# Patient Record
Sex: MALE | Race: WHITE | NOT HISPANIC OR LATINO | Employment: OTHER | URBAN - METROPOLITAN AREA
[De-identification: names, ages, dates, MRNs, and addresses within clinical notes are randomized per-mention and may not be internally consistent; named-entity substitution may affect disease eponyms.]

---

## 2019-11-26 ENCOUNTER — OFFICE VISIT (OUTPATIENT)
Dept: NEUROLOGY | Facility: CLINIC | Age: 79
End: 2019-11-26
Payer: MEDICARE

## 2019-11-26 VITALS
WEIGHT: 230.38 LBS | DIASTOLIC BLOOD PRESSURE: 60 MMHG | HEART RATE: 71 BPM | BODY MASS INDEX: 32.98 KG/M2 | HEIGHT: 70 IN | SYSTOLIC BLOOD PRESSURE: 138 MMHG

## 2019-11-26 DIAGNOSIS — G20.C PARKINSONISM, UNSPECIFIED PARKINSONISM TYPE: ICD-10-CM

## 2019-11-26 DIAGNOSIS — G20.A1 PARKINSON'S DISEASE: Primary | ICD-10-CM

## 2019-11-26 PROCEDURE — 1126F AMNT PAIN NOTED NONE PRSNT: CPT | Mod: ,,, | Performed by: PSYCHIATRY & NEUROLOGY

## 2019-11-26 PROCEDURE — 99204 OFFICE O/P NEW MOD 45 MIN: CPT | Mod: PBBFAC | Performed by: PSYCHIATRY & NEUROLOGY

## 2019-11-26 PROCEDURE — 1126F PR PAIN SEVERITY QUANTIFIED, NO PAIN PRESENT: ICD-10-PCS | Mod: ,,, | Performed by: PSYCHIATRY & NEUROLOGY

## 2019-11-26 PROCEDURE — 99999 PR PBB SHADOW E&M-NEW PATIENT-LVL IV: ICD-10-PCS | Mod: PBBFAC,,, | Performed by: PSYCHIATRY & NEUROLOGY

## 2019-11-26 PROCEDURE — 1159F MED LIST DOCD IN RCRD: CPT | Mod: ,,, | Performed by: PSYCHIATRY & NEUROLOGY

## 2019-11-26 PROCEDURE — 99204 PR OFFICE/OUTPT VISIT, NEW, LEVL IV, 45-59 MIN: ICD-10-PCS | Mod: S$PBB,,, | Performed by: PSYCHIATRY & NEUROLOGY

## 2019-11-26 PROCEDURE — 1159F PR MEDICATION LIST DOCUMENTED IN MEDICAL RECORD: ICD-10-PCS | Mod: ,,, | Performed by: PSYCHIATRY & NEUROLOGY

## 2019-11-26 PROCEDURE — 99999 PR PBB SHADOW E&M-NEW PATIENT-LVL IV: CPT | Mod: PBBFAC,,, | Performed by: PSYCHIATRY & NEUROLOGY

## 2019-11-26 PROCEDURE — 99204 OFFICE O/P NEW MOD 45 MIN: CPT | Mod: S$PBB,,, | Performed by: PSYCHIATRY & NEUROLOGY

## 2019-11-26 RX ORDER — METOPROLOL TARTRATE 25 MG/1
25 TABLET, FILM COATED ORAL 2 TIMES DAILY
COMMUNITY

## 2019-11-26 RX ORDER — CARBIDOPA AND LEVODOPA 25; 100 MG/1; MG/1
1 TABLET ORAL 3 TIMES DAILY
Qty: 90 TABLET | Refills: 11 | Status: SHIPPED | OUTPATIENT
Start: 2019-11-26 | End: 2020-02-17

## 2019-11-26 RX ORDER — ROPINIROLE 1 MG/1
1 TABLET, FILM COATED ORAL 3 TIMES DAILY
COMMUNITY
End: 2021-01-12

## 2019-11-26 RX ORDER — OMEPRAZOLE 20 MG/1
20 CAPSULE, DELAYED RELEASE ORAL
COMMUNITY

## 2019-11-26 RX ORDER — HYDROCHLOROTHIAZIDE 25 MG/1
25 TABLET ORAL
COMMUNITY

## 2019-11-26 RX ORDER — ROSUVASTATIN CALCIUM 5 MG/1
2.5 TABLET, COATED ORAL
COMMUNITY

## 2019-11-26 RX ORDER — RASAGILINE 1 MG/1
1 TABLET ORAL DAILY
COMMUNITY

## 2019-11-26 RX ORDER — WARFARIN SODIUM 5 MG/1
5 TABLET ORAL
COMMUNITY

## 2019-11-26 RX ORDER — LOSARTAN POTASSIUM 50 MG/1
50 TABLET ORAL
COMMUNITY

## 2019-11-26 NOTE — PROGRESS NOTES
"MOVEMENT DISORDERS CLINIC NEW CONSULT NOTE    PCP/Referring Provider: Reese Self  No address on file  Date of Service: 11/26/2019    Chief Complaint: FOG    HPI: Braulio Monterroso is a R HANDED 79 y.o. male with a medical issues significant for Pes, HTN, ADELINA, Cspine dz s/p surgery, hip replacement, who presents with Parkinson's with new onset FOG [facility]. He notes 10 years ago progressive dysphagia and hypophonia, followed by a shuffling gait at age 76. This started with a L-sided foot drag. He currently walks with a cane and can go half a mile with stops. Without stops he can go 1 block. After Jan 2018 he started having FOG spells. He has near constant FOG art this point. He currently uses a walker at home and a cane. He falls once a month usually forwards.     Started carbidopa/levodopa 1 year ago but after he started getting migraines.  He's never had a clear ONtime  He currently takes ropinirole 3/3/2mg  Rasagiline 1mg QHS  He has ankle swelling  No memory loss  No orthostasis  No impulsiveness    Goes to speech therapy and gets injections in his vocal cords    Does rock steady boxing  He rides a stationary bike    PD Review of Symptoms:  Anosmia: "hes's never had one"  Dysarthria/Hypophonia: Severe  Dysphagia/Sialorrhea: Yes  Hallucinations: None  Depression: None  Cognitive slowing: None  Impulsivity: None  Urinary changes: Frequency  Constipation: None  Orthostasis: None  Dyskinesia: None  Falls: As above  Freezing: Yes  Micrographia: Yes  Sleep issues:  -ADELINA: wears cpap  -RBD: None   -Sleep Quality: at times OK      Review of Systems:   Review of Systems   Constitutional: Negative for fever.   HENT: Negative for congestion.    Eyes: Negative for double vision.   Respiratory: Negative for cough and shortness of breath.    Cardiovascular: Negative for chest pain and leg swelling.   Gastrointestinal: Negative for nausea.   Genitourinary: Negative for dysuria.   Musculoskeletal: " Positive for falls.   Skin: Negative for rash.   Neurological: Positive for tremors and speech change. Negative for headaches.   Psychiatric/Behavioral: Negative for depression.       Neuroleptic exposure:  None    Current Medications:  Outpatient Encounter Medications as of 11/26/2019   Medication Sig Dispense Refill    hydroCHLOROthiazide (HYDRODIURIL) 25 MG tablet Take 25 mg by mouth.      losartan (COZAAR) 50 MG tablet Take 50 mg by mouth.      metoprolol tartrate (LOPRESSOR) 25 MG tablet Take 25 mg by mouth 2 (two) times daily.      omeprazole (PRILOSEC) 20 MG capsule Take 20 mg by mouth.      rasagiline (AZILECT) 1 mg Tab Take 1 mg by mouth once daily.      rOPINIRole (REQUIP) 1 MG tablet Take 1 mg by mouth 3 (three) times daily. Take 3 tablets by mouth 2 times a day and take 2 tablets at bedtime      rosuvastatin (CRESTOR) 5 MG tablet Take 2.5 mg by mouth.      warfarin (COUMADIN) 5 MG tablet Take 5 mg by mouth.      carbidopa-levodopa  mg (SINEMET)  mg per tablet Take 1 tablet by mouth 3 (three) times daily. 90 tablet 11     No facility-administered encounter medications on file as of 11/26/2019.        Past Medical History:  Patient Active Problem List   Diagnosis    Parkinsonism       Past Surgical History:  History reviewed. No pertinent surgical history.    Current Living Situation: home    Social:  Social History     Socioeconomic History    Marital status:      Spouse name: Not on file    Number of children: Not on file    Years of education: Not on file    Highest education level: Not on file   Occupational History    Not on file   Social Needs    Financial resource strain: Not on file    Food insecurity:     Worry: Not on file     Inability: Not on file    Transportation needs:     Medical: Not on file     Non-medical: Not on file   Tobacco Use    Smoking status: Never Smoker    Smokeless tobacco: Never Used   Substance and Sexual Activity    Alcohol use: Never  "    Frequency: Never    Drug use: Not on file    Sexual activity: Not on file   Lifestyle    Physical activity:     Days per week: Not on file     Minutes per session: Not on file    Stress: Not on file   Relationships    Social connections:     Talks on phone: Not on file     Gets together: Not on file     Attends Gnosticism service: Not on file     Active member of club or organization: Not on file     Attends meetings of clubs or organizations: Not on file     Relationship status: Not on file   Other Topics Concern    Not on file   Social History Narrative    Not on file       Family History:  History reviewed. No pertinent family history.    PHYSICAL:  /60 (BP Location: Left arm, Patient Position: Sitting)   Pulse 71   Ht 5' 10" (1.778 m)   Wt 104.5 kg (230 lb 6.1 oz)   BMI 33.06 kg/m²     General Medical Examination:  General: Good hygiene, appropriate appearance.  HEENT: Normocephalic, atraumatic.   Neck: Supple.   Chest: Unlabored breathing.   CV: Symmetric pulses.   Ext: No clubbing, cyanosis, or edema.     Mental Status:  Mood/Affect: Appropriate/congruent.  Level of consciousness: Awake, alert.  Orientation: Oriented to person, place, time and situation.  Language: No Dysarthria    Cranial nerves:  I: Not tested  II: PERRL, VFF to counting  III, IV, VI: EOMI with conjugate gaze and no nystagmus on end gaze  V: Facial sensation intact and symmetric over the bilateral V1-V3  VII: Facial muscle activation intact and symmetric over the bilateral upper and lower face  VIII: Hearing intact in the b/l ears and symmetrical to finger rub  IX, X, XII: TUP midline - no atrophy or fasiculations  X: SCMs and shoulder shrug full strength b/l and symmetric  Moderate hypomimia  Severe hypophonia    Motor:   -UE: 5/5 deltoids; 5/5 biceps, triceps; 5/5 wrist flexors, extensors; 5/5 interosseous; 5/5   -LEs: 5/5 hip flexion, extension; 5/5 knee flexion, extension; 5/5 ankle flexion, extension    DTRs:  ? " Biceps Triceps Brachioradialis Knee Ankle   Left 2+ 2+ 2+ 2+ 2+   Right 2+ 2+ 2+ 2+ 2+     ? Finger taps Finger flicks SCOTTIE Heel taps   Left 2+ 2+ 2+ 2+   Right 3+ 3+ 2+ 2+   Neck tone: 0  ? Arm Leg   Left 1+ 1+   Right 2+ 2+     Sensation:   -Light touch: Intact and symmetric in the bilateral upper and lower extremities.    Coordination:   -Finger to nose: nl    Gait:  -Arises from chair with use of hands.  -Casual gait is: narrow based, stooped  -Stride length: short, shuffling, with FOG on L, then better after he gets moving  -Arm Swing: decreased BL  -Turnin step  -Tandem gait: cannot      Laboratory Data:  NA    Imaging:  Brain MRI per report NL - no disc avaialble    Assessment//Plan:   Problem List Items Addressed This Visit        Neuro    Parkinsonism - Primary    Current Assessment & Plan     Rigid-predominate PDism. Appear under-dosed on his current regimen, without a noticeable ONtime and prominent FOG. Suggested restarting carbidopa/levodopa 25/100mg 1 tab PO TID. Plan to wean ropinirole later.         Relevant Orders    Ambulatory consult to Physical Therapy          Follow-up: 3mos  Discussed the importance of ongoing exercise in efforts to improve mobility and balance.    Amanda Delgado MD, MS [facility]  Department of Neurology  Movement Disorders

## 2019-11-26 NOTE — ASSESSMENT & PLAN NOTE
Rigid-predominate PDism. Appear under-dosed on his current regimen, without a noticeable ONtime and prominent FOG. Suggested restarting carbidopa/levodopa 25/100mg 1 tab PO TID. Plan to wean ropinirole later.

## 2019-12-05 ENCOUNTER — PATIENT MESSAGE (OUTPATIENT)
Dept: NEUROLOGY | Facility: CLINIC | Age: 79
End: 2019-12-05

## 2019-12-11 ENCOUNTER — PATIENT MESSAGE (OUTPATIENT)
Dept: NEUROLOGY | Facility: CLINIC | Age: 79
End: 2019-12-11

## 2020-01-27 ENCOUNTER — PATIENT MESSAGE (OUTPATIENT)
Dept: NEUROLOGY | Facility: CLINIC | Age: 80
End: 2020-01-27

## 2020-02-17 ENCOUNTER — OFFICE VISIT (OUTPATIENT)
Dept: NEUROLOGY | Facility: CLINIC | Age: 80
End: 2020-02-17
Payer: MEDICARE

## 2020-02-17 VITALS
HEIGHT: 70 IN | DIASTOLIC BLOOD PRESSURE: 59 MMHG | HEART RATE: 68 BPM | BODY MASS INDEX: 33.06 KG/M2 | SYSTOLIC BLOOD PRESSURE: 145 MMHG

## 2020-02-17 DIAGNOSIS — G20.C PARKINSONISM, UNSPECIFIED PARKINSONISM TYPE: ICD-10-CM

## 2020-02-17 PROCEDURE — 99214 OFFICE O/P EST MOD 30 MIN: CPT | Mod: S$PBB,,, | Performed by: PSYCHIATRY & NEUROLOGY

## 2020-02-17 PROCEDURE — 99214 PR OFFICE/OUTPT VISIT, EST, LEVL IV, 30-39 MIN: ICD-10-PCS | Mod: S$PBB,,, | Performed by: PSYCHIATRY & NEUROLOGY

## 2020-02-17 PROCEDURE — 99999 PR PBB SHADOW E&M-EST. PATIENT-LVL III: CPT | Mod: PBBFAC,,, | Performed by: PSYCHIATRY & NEUROLOGY

## 2020-02-17 PROCEDURE — 99999 PR PBB SHADOW E&M-EST. PATIENT-LVL III: ICD-10-PCS | Mod: PBBFAC,,, | Performed by: PSYCHIATRY & NEUROLOGY

## 2020-02-17 PROCEDURE — 99213 OFFICE O/P EST LOW 20 MIN: CPT | Mod: PBBFAC | Performed by: PSYCHIATRY & NEUROLOGY

## 2020-02-17 RX ORDER — CHOLECALCIFEROL (VITAMIN D3) 50 MCG
2000 TABLET ORAL 2 TIMES DAILY
COMMUNITY

## 2020-02-17 RX ORDER — CARBIDOPA AND LEVODOPA 25; 100 MG/1; MG/1
1 TABLET ORAL 4 TIMES DAILY
Qty: 120 TABLET | Refills: 11
Start: 2020-02-17 | End: 2020-03-30 | Stop reason: SDUPTHER

## 2020-02-17 NOTE — ASSESSMENT & PLAN NOTE
Rigid-predominate PDism. Appear under-dosed on his current regimen, without a noticeable ONtime and prominent FOG.  Plan to wean ropinirole later.    -INCREASE Sinemet 25/100 1 tab PO to QID  -Continue ropinirole 1 mg tab 3, 2, 2   -Continue rasagiline 1 mg Qdaily  -Keep journal of PD symptoms/sinemet  -Use laser target for gait     -Return to clinic 1 month

## 2020-02-17 NOTE — PROGRESS NOTES
"MOVEMENT DISORDERS CLINIC Follow-up NOTE    PCP/Referring Provider: Reese Self  No address on file  Date of Service: 2/17/2020    Chief Complaint: FOG    HPI:  2/17/20 Interval History    Currently started sinemet 1 tab TID and noticed improvement in his gait (no change in freezing) - wife describes decreased shuffling.  Ropinirole was decreased tablets from 1 mg  3, 3, 2 tabs -> 3, 2, 2 tabs and noticed that gait subsequently worsened again.     "Prior HPI  Braulio Monterroso is a R HANDED 79 y.o. male with a medical issues significant for Pes, HTN, ADELINA, Cspine dz s/p surgery, hip replacement, who presents with Parkinson's with new onset FOG [facility]. He notes 10 years ago progressive dysphagia and hypophonia, followed by a shuffling gait at age 76. This started with a L-sided foot drag. He currently walks with a cane and can go half a mile with stops. Without stops he can go 1 block. After Jan 2018 he started having FOG spells. He has near constant FOG art this point. He currently uses a walker at home and a cane. He falls once a month usually forwards.     Started carbidopa/levodopa 1 year ago but after he started getting migraines.  He's never had a clear ONtime  He currently takes ropinirole 3/3/2mg  Rasagiline 1mg QHS  He has ankle swelling  No memory loss  No orthostasis  No impulsiveness    Goes to speech therapy and gets injections in his vocal cords    Does rock steady boxing  He rides a stationary bike"      PD Review of Symptoms:  Anosmia: "hes's never had one"  Dysarthria/Hypophonia: Severe  Dysphagia/Sialorrhea: Yes  Hallucinations: None  Depression: None  Cognitive slowing: None  Impulsivity: None  Urinary changes: Frequency  Constipation: None  Orthostasis: None  Dyskinesia: None  Falls: As above  Freezing: Yes  Micrographia: Yes  Sleep issues:  -ADELINA: wears cpap  -RBD: None   -Sleep Quality: at times OK      Review of Systems:   Review of Systems   Constitutional: " Negative for fever.   HENT: Negative for congestion.    Eyes: Negative for double vision.   Respiratory: Negative for cough and shortness of breath.    Cardiovascular: Negative for chest pain and leg swelling.   Gastrointestinal: Negative for nausea.   Genitourinary: Negative for dysuria.   Musculoskeletal: Positive for falls.   Skin: Negative for rash.   Neurological: Positive for tremors and speech change. Negative for headaches.   Psychiatric/Behavioral: Negative for depression.       Neuroleptic exposure:  None    Current Medications:  Outpatient Encounter Medications as of 2/17/2020   Medication Sig Dispense Refill    carbidopa-levodopa  mg (SINEMET)  mg per tablet Take 1 tablet by mouth 3 (three) times daily. 90 tablet 11    cholecalciferol, vitamin D3, (VITAMIN D3) 2,000 unit Tab Take 2,000 Units by mouth 2 (two) times daily.      hydroCHLOROthiazide (HYDRODIURIL) 25 MG tablet Take 25 mg by mouth.      losartan (COZAAR) 50 MG tablet Take 50 mg by mouth.      metoprolol tartrate (LOPRESSOR) 25 MG tablet Take 25 mg by mouth 2 (two) times daily.      omeprazole (PRILOSEC) 20 MG capsule Take 20 mg by mouth.      rasagiline (AZILECT) 1 mg Tab Take 1 mg by mouth once daily.      rOPINIRole (REQUIP) 1 MG tablet Take 1 mg by mouth 3 (three) times daily. Take 3 tablets by mouth 2 times a day and take 2 tablets at bedtime      rosuvastatin (CRESTOR) 5 MG tablet Take 2.5 mg by mouth.      warfarin (COUMADIN) 5 MG tablet Take 5 mg by mouth.       No facility-administered encounter medications on file as of 2/17/2020.        Past Medical History:  Patient Active Problem List   Diagnosis    Parkinsonism       Past Surgical History:  History reviewed. No pertinent surgical history.    Current Living Situation: home    Social:  Social History     Socioeconomic History    Marital status:      Spouse name: Not on file    Number of children: Not on file    Years of education: Not on file     "Highest education level: Not on file   Occupational History    Not on file   Social Needs    Financial resource strain: Not on file    Food insecurity:     Worry: Not on file     Inability: Not on file    Transportation needs:     Medical: Not on file     Non-medical: Not on file   Tobacco Use    Smoking status: Never Smoker    Smokeless tobacco: Never Used   Substance and Sexual Activity    Alcohol use: Never     Frequency: Never    Drug use: Not on file    Sexual activity: Not on file   Lifestyle    Physical activity:     Days per week: Not on file     Minutes per session: Not on file    Stress: Not on file   Relationships    Social connections:     Talks on phone: Not on file     Gets together: Not on file     Attends Orthodoxy service: Not on file     Active member of club or organization: Not on file     Attends meetings of clubs or organizations: Not on file     Relationship status: Not on file   Other Topics Concern    Not on file   Social History Narrative    Not on file       Family History:  History reviewed. No pertinent family history.    PHYSICAL:  BP (!) 145/59 (BP Location: Left arm, Patient Position: Sitting)   Pulse 68   Ht 5' 10" (1.778 m)   BMI 33.06 kg/m²     General Medical Examination:  General: Good hygiene, appropriate appearance.  HEENT: Normocephalic, atraumatic.   Neck: Supple.   Chest: Unlabored breathing.   CV: Symmetric pulses.   Ext: No clubbing, cyanosis, or edema.     Mental Status:  Mood/Affect: Appropriate/congruent.  Level of consciousness: Awake, alert.  Orientation: Oriented to person, place, time and situation.  Language: No Dysarthria    Cranial nerves:  I: Not tested  II: PERRL, VFF to counting  III, IV, VI: EOMI with conjugate gaze and no nystagmus on end gaze  V: Facial sensation intact and symmetric over the bilateral V1-V3  VII: Facial muscle activation intact and symmetric over the bilateral upper and lower face  VIII: Hearing intact in the b/l ears and " symmetrical to finger rub  IX, X, XII: TUP midline - no atrophy or fasiculations  X: SCMs and shoulder shrug full strength b/l and symmetric  Moderate hypomimia  Moderate hypophonia    Motor:   -UE: 5/5 deltoids; 5/5 biceps, triceps; 5/5 wrist flexors, extensors; 5/5 interosseous; 5/5   -LEs: 5/5 hip flexion, extension; 5/5 knee flexion, extension; 5/5 ankle flexion, extension    DTRs:  ? Biceps Triceps Brachioradialis Knee Ankle   Left 2+ 2+ 2+ 2+ 2+   Right 2+ 2+ 2+ 2+ 2+     ? Finger taps Finger flicks SCOTTIE Heel taps   Left 2+ 1+ 1+ 1+   Right 3+ 2+ 1+ 2+   Neck tone: 0  ? Arm Leg   Left 1+ 1+   Right 2+ 2+     Sensation:   -Light touch: Intact and symmetric in the bilateral upper and lower extremities.    Coordination:   -Finger to nose: nl    Gait:  -Arises from chair with use of hands.  -Casual gait is: narrow based, stooped  -Stride length: short, shuffling, with FOG on L, then better after he gets moving  -Arm Swing: decreased BL  -Turning: left > right.  En bloc turning, 8-10 steps.  -Tandem gait: cannot      Laboratory Data:  NA    Imaging:  Brain MRI per report NL - no disc avaialble    Assessment//Plan:   Problem List Items Addressed This Visit        Neuro    Parkinsonism    Current Assessment & Plan     Rigid-predominate PDism. Appear under-dosed on his current regimen, without a noticeable ONtime and prominent FOG.  Plan to wean ropinirole later.    -INCREASE Sinemet 25/100 1 tab PO to QID  -Continue ropinirole 1 mg tab 3, 2, 2   -Continue rasagiline 1 mg Qdaily  -Keep journal of PD symptoms/sinemet  -Use laser target for gait                  Follow-up: 1 mos  Discussed the importance of ongoing exercise in efforts to improve mobility and balance.    Amanda Delgado MD, MS [facility]  Department of Neurology  Movement Disorders

## 2020-02-27 ENCOUNTER — PATIENT MESSAGE (OUTPATIENT)
Dept: NEUROLOGY | Facility: CLINIC | Age: 80
End: 2020-02-27

## 2020-03-16 ENCOUNTER — PATIENT MESSAGE (OUTPATIENT)
Dept: NEUROLOGY | Facility: CLINIC | Age: 80
End: 2020-03-16

## 2020-03-30 ENCOUNTER — PATIENT MESSAGE (OUTPATIENT)
Dept: NEUROLOGY | Facility: CLINIC | Age: 80
End: 2020-03-30

## 2020-03-30 ENCOUNTER — TELEPHONE (OUTPATIENT)
Dept: NEUROLOGY | Facility: CLINIC | Age: 80
End: 2020-03-30

## 2020-03-30 ENCOUNTER — OFFICE VISIT (OUTPATIENT)
Dept: NEUROLOGY | Facility: CLINIC | Age: 80
End: 2020-03-30
Payer: MEDICARE

## 2020-03-30 DIAGNOSIS — G20.C PARKINSONISM, UNSPECIFIED PARKINSONISM TYPE: ICD-10-CM

## 2020-03-30 DIAGNOSIS — H02.402 PTOSIS OF LEFT EYELID: ICD-10-CM

## 2020-03-30 PROBLEM — H02.409 PTOSIS: Status: ACTIVE | Noted: 2020-03-30

## 2020-03-30 PROCEDURE — 99215 OFFICE O/P EST HI 40 MIN: CPT | Mod: 95,,, | Performed by: PSYCHIATRY & NEUROLOGY

## 2020-03-30 PROCEDURE — 99215 PR OFFICE/OUTPT VISIT, EST, LEVL V, 40-54 MIN: ICD-10-PCS | Mod: 95,,, | Performed by: PSYCHIATRY & NEUROLOGY

## 2020-03-30 RX ORDER — CARBIDOPA AND LEVODOPA 25; 100 MG/1; MG/1
1.5 TABLET ORAL 4 TIMES DAILY
Qty: 120 TABLET | Refills: 11
Start: 2020-03-30 | End: 2020-09-29

## 2020-03-30 NOTE — PROGRESS NOTES
"The patient location is: home  The chief complaint leading to consultation is: FOG  Visit type: Virtual visit with synchronous audio and video  Total time spent with patient: 20 minutes  Each patient to whom he or she provides medical services by telemedicine is:  (1) informed of the relationship between the physician and patient and the respective role of any other health care provider with respect to management of the patient; and (2) notified that he or she may decline to receive medical services by telemedicine and may withdraw from such care at any time.    MOVEMENT DISORDERS CLINIC Follow-up NOTE    PCP/Referring Provider: No referring provider defined for this encounter.  Date of Service: 3/30/2020    Chief Complaint: FOG    Interval Hx    Since last visit,   Falls: once  Assist Device:  None    Feels like FOG got more prominent  Had one day of no FOG but doesn't understand why  -increased Sinemet 25/100 1 tab PO to QID from TID  Takes every 4 hours on average  -Continues ropinirole 1 mg tab 3, 2, 2   -Continues rasagiline 1 mg Qdaily    Thinks he doesn't have OFFtime but his voice comes and goes    No dyskinesias, no orthostasis    PD Review of Symptoms:  Anosmia: "hes's never had one"  Dysarthria/Hypophonia: Severe  Dysphagia/Sialorrhea: Yes to both  Hallucinations: None  Depression: None  Cognitive slowing: None  Impulsivity: None  Urinary changes: Frequency  Constipation: None  Orthostasis: None  Dyskinesia: None  Falls: As above  Freezing: Yes  Micrographia: Yes  Sleep issues:  -ADELINA: wears cpap  -RBD: None   -Sleep Quality: at times OK        "priorHPI:  2/17/20 Interval History    Currently started sinemet 1 tab TID and noticed improvement in his gait (no change in freezing) - wife describes decreased shuffling.  Ropinirole was decreased tablets from 1 mg  3, 3, 2 tabs -> 3, 2, 2 tabs and noticed that gait subsequently worsened again.     "Prior HPI  Braulio Cindigeorges Kriss is a R HANDED 79 y.o. male " "with a medical issues significant for Pes, HTN, ADELINA, Cspine dz s/p surgery, hip replacement, who presents with Parkinson's with new onset FOG [facility]. He notes 10 years ago progressive dysphagia and hypophonia, followed by a shuffling gait at age 76. This started with a L-sided foot drag. He currently walks with a cane and can go half a mile with stops. Without stops he can go 1 block. After Jan 2018 he started having FOG spells. He has near constant FOG art this point. He currently uses a walker at home and a cane. He falls once a month usually forwards.     Started carbidopa/levodopa 1 year ago but after he started getting migraines.  He's never had a clear ONtime  He currently takes ropinirole 3/3/2mg  Rasagiline 1mg QHS  He has ankle swelling  No memory loss  No orthostasis  No impulsiveness    Goes to speech therapy and gets injections in his vocal cords    Does rock steady boxing  He rides a stationary bike"      Review of Systems:   Review of Systems   Constitutional: Negative for fever.   HENT: Negative for congestion.    Eyes: Negative for double vision.   Respiratory: Negative for cough and shortness of breath.    Cardiovascular: Negative for chest pain and leg swelling.   Gastrointestinal: Negative for nausea.   Genitourinary: Negative for dysuria.   Musculoskeletal: Positive for falls.   Skin: Negative for rash.   Neurological: Positive for tremors and speech change. Negative for headaches.   Psychiatric/Behavioral: Negative for depression.       Neuroleptic exposure:  None    Current Medications:  Outpatient Encounter Medications as of 3/30/2020   Medication Sig Dispense Refill    carbidopa-levodopa  mg (SINEMET)  mg per tablet Take 1.5 tablets by mouth 4 (four) times daily. 120 tablet 11    cholecalciferol, vitamin D3, (VITAMIN D3) 2,000 unit Tab Take 2,000 Units by mouth 2 (two) times daily.      hydroCHLOROthiazide (HYDRODIURIL) 25 MG tablet Take 25 mg by mouth.   "    losartan (COZAAR) 50 MG tablet Take 50 mg by mouth.      metoprolol tartrate (LOPRESSOR) 25 MG tablet Take 25 mg by mouth 2 (two) times daily.      omeprazole (PRILOSEC) 20 MG capsule Take 20 mg by mouth.      rasagiline (AZILECT) 1 mg Tab Take 1 mg by mouth once daily.      rOPINIRole (REQUIP) 1 MG tablet Take 1 mg by mouth 3 (three) times daily. Take 3 tablets by mouth 2 times a day and take 2 tablets at bedtime      rosuvastatin (CRESTOR) 5 MG tablet Take 2.5 mg by mouth.      warfarin (COUMADIN) 5 MG tablet Take 5 mg by mouth.      [DISCONTINUED] carbidopa-levodopa  mg (SINEMET)  mg per tablet Take 1 tablet by mouth 4 (four) times daily. 120 tablet 11     No facility-administered encounter medications on file as of 3/30/2020.        Past Medical History:  Patient Active Problem List   Diagnosis    Parkinsonism    Ptosis       Past Surgical History:  No past surgical history on file.    Current Living Situation: home    Social:  Social History     Socioeconomic History    Marital status:      Spouse name: Not on file    Number of children: Not on file    Years of education: Not on file    Highest education level: Not on file   Occupational History    Not on file   Social Needs    Financial resource strain: Not on file    Food insecurity:     Worry: Not on file     Inability: Not on file    Transportation needs:     Medical: Not on file     Non-medical: Not on file   Tobacco Use    Smoking status: Never Smoker    Smokeless tobacco: Never Used   Substance and Sexual Activity    Alcohol use: Never     Frequency: Never    Drug use: Not on file    Sexual activity: Not on file   Lifestyle    Physical activity:     Days per week: Not on file     Minutes per session: Not on file    Stress: Not on file   Relationships    Social connections:     Talks on phone: Not on file     Gets together: Not on file     Attends Moravian service: Not on file     Active member of club or  organization: Not on file     Attends meetings of clubs or organizations: Not on file     Relationship status: Not on file   Other Topics Concern    Not on file   Social History Narrative    Not on file       Family History:  No family history on file.    PHYSICAL:  There were no vitals taken for this visit. Physical Exam  Constitutional: Well-developed, well-nourished, appears stated age  Eyes: No scleral icterus  ENT: Moist oral mucosa  Cardiovascular: No lower extremity edema   Respiratory: No labored breathing   Skin: No rash   Hematologic: No bruising  Psychiatric: No depression  Other: GI/ deferred   · Mental status: Alert and oriented to person, place, time, and situation;   · WORLD-DLROW; 5/5à 5/5 words, follows commands  · Speech: normal (not dysarthric), no aphasia  · Cranial nerves:            · CN II: Pupils mid-position and equal, not tested light or accommodation  · CN III, IV, VI: Extraocular movements full, no nystagmus visualized  · CN V: Not tested   · CN VII: Face strong and symmetric bilaterally (see ptosis)  · CN VIII: Hearing intact to voice and conversation   · CN IX, X: Palate raises midline and symmetric   · CN XI: Strong shoulder shrug B   · CN XII: Tongue appears midline   · Motor: Normal bulk by appearance, no drift   · Sensory: Not tested    · Gait: Not tested  · Deep tendon reflexes: Not tested  · Movement/Coordination                    Moderate hypophonic speech.                     Moderate facial masking, L eye Ptosis.    Bradykinesia  ? Finger taps Finger flicks SCOTTIE Heel taps   Left 2+ 2+  -   Right 2+ 2+ - -       No tremor        Laboratory Data:  NA    Imaging:  Brain MRI per report NL - no disc avaialble    Assessment//Plan:   Problem List Items Addressed This Visit        Neuro    Parkinsonism    Current Assessment & Plan     Rigid-predominate PDism. Appear under-dosed on his current regimen, without a noticeable ONtime and prominent FOG.  Plan to wean ropinirole  later.    -INCREASE Sinemet 25/100 1.5 tab PO QID  -Continue ropinirole 1 mg tab 3, 2, 2   -Continue rasagiline 1 mg Qdaily  -Keep journal of PD symptoms/sinemet  -Use laser target for gait     -Return to clinic 1 month           Relevant Medications    carbidopa-levodopa  mg (SINEMET)  mg per tablet       Ophtho    Ptosis    Current Assessment & Plan     L eye ptosis- [facility]               Follow-up: 1 mos  Discussed the importance of ongoing exercise in efforts to improve mobility and balance.    Amanda Delgado MD, MS  [facility]  Department of Neurology  Movement Disorders

## 2020-03-30 NOTE — ASSESSMENT & PLAN NOTE
Rigid-predominate PDism. Appear under-dosed on his current regimen, without a noticeable ONtime and prominent FOG.  Plan to wean ropinirole later.    -INCREASE Sinemet 25/100 1.5 tab PO QID  -Continue ropinirole 1 mg tab 3, 2, 2   -Continue rasagiline 1 mg Qdaily  -Keep journal of PD symptoms/sinemet  -Use laser target for gait     -Return to clinic 1 month

## 2020-03-30 NOTE — TELEPHONE ENCOUNTER
Spoke with Mr. Monterroso, he scheduled his 1 month video follow up for 5/8/2020 at 9:20. Appt letter to be mailed

## 2020-04-03 ENCOUNTER — PATIENT MESSAGE (OUTPATIENT)
Dept: NEUROLOGY | Facility: CLINIC | Age: 80
End: 2020-04-03

## 2020-04-08 ENCOUNTER — PATIENT MESSAGE (OUTPATIENT)
Dept: NEUROLOGY | Facility: CLINIC | Age: 80
End: 2020-04-08

## 2020-04-10 ENCOUNTER — PATIENT MESSAGE (OUTPATIENT)
Dept: NEUROLOGY | Facility: CLINIC | Age: 80
End: 2020-04-10

## 2020-05-08 ENCOUNTER — OFFICE VISIT (OUTPATIENT)
Dept: NEUROLOGY | Facility: CLINIC | Age: 80
End: 2020-05-08
Payer: MEDICARE

## 2020-05-08 DIAGNOSIS — G20.C PARKINSONISM, UNSPECIFIED PARKINSONISM TYPE: Primary | ICD-10-CM

## 2020-05-08 PROCEDURE — 99214 OFFICE O/P EST MOD 30 MIN: CPT | Mod: 95,,, | Performed by: PSYCHIATRY & NEUROLOGY

## 2020-05-08 PROCEDURE — 99214 PR OFFICE/OUTPT VISIT, EST, LEVL IV, 30-39 MIN: ICD-10-PCS | Mod: 95,,, | Performed by: PSYCHIATRY & NEUROLOGY

## 2020-05-08 NOTE — ASSESSMENT & PLAN NOTE
Rigid-predominate PDism. Appears under-dosed on his current regimen, without a noticeable ONtime and prominent FOG. FOG worsened either due to lack of PT or increase in carbidopa/levodopa 25/100mg.    Suggested decrease  Sinemet 25/100 1.5 tab to 1 tab PO QID  -Continue ropinirole 1 mg tab 3, 2, 2   -Continue rasagiline 1 mg Qdaily  -Keep journal of PD symptoms/sinemet  -Use laser target for gait     If no change after 1 week, will suggest aggressive PT for FOG  Asked him to have his MRI brain uploaded to screen for signs of PSP given facial dystonia and FOG

## 2020-05-12 ENCOUNTER — PATIENT MESSAGE (OUTPATIENT)
Dept: NEUROLOGY | Facility: CLINIC | Age: 80
End: 2020-05-12

## 2020-05-13 ENCOUNTER — PATIENT MESSAGE (OUTPATIENT)
Dept: NEUROLOGY | Facility: CLINIC | Age: 80
End: 2020-05-13

## 2020-05-15 ENCOUNTER — PATIENT MESSAGE (OUTPATIENT)
Dept: NEUROLOGY | Facility: CLINIC | Age: 80
End: 2020-05-15

## 2020-06-10 ENCOUNTER — TELEPHONE (OUTPATIENT)
Dept: NEUROLOGY | Facility: CLINIC | Age: 80
End: 2020-06-10

## 2020-06-10 NOTE — TELEPHONE ENCOUNTER
Spoke with Mr. Monterroso, he was informed his 6/19 appt will need to be rescheduled due to the provider not being in clinic. He voiced understanding rescheduling for 6/22/2020 at 1:40.

## 2020-06-22 ENCOUNTER — OFFICE VISIT (OUTPATIENT)
Dept: NEUROLOGY | Facility: CLINIC | Age: 80
End: 2020-06-22
Payer: MEDICARE

## 2020-06-22 VITALS
SYSTOLIC BLOOD PRESSURE: 155 MMHG | DIASTOLIC BLOOD PRESSURE: 76 MMHG | BODY MASS INDEX: 35.05 KG/M2 | WEIGHT: 244.81 LBS | HEART RATE: 86 BPM | HEIGHT: 70 IN

## 2020-06-22 DIAGNOSIS — R26.89 PRIMARY FREEZING OF GAIT: ICD-10-CM

## 2020-06-22 DIAGNOSIS — G20.C PARKINSONISM, UNSPECIFIED PARKINSONISM TYPE: ICD-10-CM

## 2020-06-22 PROCEDURE — 99215 OFFICE O/P EST HI 40 MIN: CPT | Mod: S$PBB,,, | Performed by: PSYCHIATRY & NEUROLOGY

## 2020-06-22 PROCEDURE — 99999 PR PBB SHADOW E&M-EST. PATIENT-LVL III: ICD-10-PCS | Mod: PBBFAC,,, | Performed by: PSYCHIATRY & NEUROLOGY

## 2020-06-22 PROCEDURE — 99213 OFFICE O/P EST LOW 20 MIN: CPT | Mod: PBBFAC | Performed by: PSYCHIATRY & NEUROLOGY

## 2020-06-22 PROCEDURE — 99999 PR PBB SHADOW E&M-EST. PATIENT-LVL III: CPT | Mod: PBBFAC,,, | Performed by: PSYCHIATRY & NEUROLOGY

## 2020-06-22 PROCEDURE — 99215 PR OFFICE/OUTPT VISIT, EST, LEVL V, 40-54 MIN: ICD-10-PCS | Mod: S$PBB,,, | Performed by: PSYCHIATRY & NEUROLOGY

## 2020-06-22 RX ORDER — MELATONIN 3 MG
3 CAPSULE ORAL
COMMUNITY

## 2020-06-22 NOTE — ASSESSMENT & PLAN NOTE
Rigid-predominate PDism. Appears well does on his current regimen, with FOG out of proportionate to rigidity.  Due to leg swelling suggested decrease ropinirole to OFF slowly over 8 weeks  Suggested continue  Sinemet 25/100 1.5 tab PO TID  -Continue rasagiline 1 mg Qdaily  -Keep journal of PD symptoms/sinemet  -Use laser target for gait (thids worked well in clinic today)     Suggested to restart agressive PT    Asked him to have his MRI brain uploaded to screen for signs of PSP given facial dystonia and FOG

## 2020-06-22 NOTE — PROGRESS NOTES
"MOVEMENT DISORDERS CLINIC Follow-up NOTE    PCP/Referring Provider: No referring provider defined for this encounter.  Date of Service: 6/22/2020    Chief Complaint: FOG    Interval Hx    Since last visit,   -Sinemet 25/100 1.5 tab PO TID - decreased from QID - this did not help FOG  -Continues ropinirole 0.5 mg 2 tabs four times daily  -Continues rasagiline 1 mg Qdaily  Stopped rock steady boxing and PT  Walks with a walker and just walks between rooms  FOG is markedly worse - frozen every time he stands up  Still no falls  Wife needs to help him often to un-freeze    Uses a cane and walker  Has not gotten laser cane    No obsessive thoughts, collecting, hallucinations    DATscan brought in today shows diminished uptake R    Falls: None    Thinks he doesn't have OFFtime but his voice comes and goes  No dyskinesias, no orthostasis    PD Review of Symptoms:  Anosmia: "hes's never had one"  Dysarthria/Hypophonia: Severe  Dysphagia/Sialorrhea: Yes to both  Hallucinations: None  Depression: None  Cognitive slowing: None  Impulsivity: None  Urinary changes: Frequency  Constipation: None  Orthostasis: None  Dyskinesia: None  Falls: As above  Freezing: Yes  Micrographia: Yes  Sleep issues:  -ADELINA: wears cpap  -RBD: None   -Sleep Quality: at times OK      "priorHPI:  2/17/20 Interval History    Currently started sinemet 1 tab TID and noticed improvement in his gait (no change in freezing) - wife describes decreased shuffling.  Ropinirole was decreased tablets from 1 mg  3, 3, 2 tabs -> 3, 2, 2 tabs and noticed that gait subsequently worsened again.     "Prior HPI  Braulio Monterroso is a R HANDED 80 y.o. male with a medical issues significant for Pes, HTN, ADELINA, Cspine dz s/p surgery, hip replacement, who presents with Parkinson's with new onset FOG [facility]. He notes 10 years ago progressive dysphagia and hypophonia, followed by a shuffling gait at age 76. This started with a L-sided foot drag. He " "currently walks with a cane and can go half a mile with stops. Without stops he can go 1 block. After Jan 2018 he started having FOG spells. He has near constant FOG art this point. He currently uses a walker at home and a cane. He falls once a month usually forwards.     Started carbidopa/levodopa 1 year ago but after he started getting migraines.  He's never had a clear ONtime  He currently takes ropinirole 3/3/2mg  Rasagiline 1mg QHS  He has ankle swelling  No memory loss  No orthostasis  No impulsiveness    Goes to speech therapy and gets injections in his vocal cords    Does rock steady boxing  He rides a stationary bike"      Review of Systems:   Review of Systems   Constitutional: Negative for fever.   HENT: Negative for congestion.    Eyes: Negative for double vision.   Respiratory: Negative for cough and shortness of breath.    Cardiovascular: Negative for chest pain and leg swelling.   Gastrointestinal: Negative for nausea.   Genitourinary: Negative for dysuria.   Musculoskeletal: Positive for falls.   Skin: Negative for rash.   Neurological: Positive for tremors and speech change. Negative for headaches.   Psychiatric/Behavioral: Negative for depression.       Neuroleptic exposure:  None    Current Medications:  Outpatient Encounter Medications as of 6/22/2020   Medication Sig Dispense Refill    carbidopa-levodopa  mg (SINEMET)  mg per tablet Take 1.5 tablets by mouth 4 (four) times daily. 120 tablet 11    hydroCHLOROthiazide (HYDRODIURIL) 25 MG tablet Take 25 mg by mouth.      losartan (COZAAR) 50 MG tablet Take 50 mg by mouth.      melatonin 3 mg Cap Take 3 mg by mouth as needed.      metoprolol tartrate (LOPRESSOR) 25 MG tablet Take 25 mg by mouth 2 (two) times daily.      rasagiline (AZILECT) 1 mg Tab Take 1 mg by mouth once daily.      rOPINIRole (REQUIP) 1 MG tablet Take 1 mg by mouth 3 (three) times daily. Take 3 tablets by mouth 2 times a day and take 2 tablets at bedtime      " rosuvastatin (CRESTOR) 5 MG tablet Take 2.5 mg by mouth.      warfarin (COUMADIN) 5 MG tablet Take 5 mg by mouth.      cholecalciferol, vitamin D3, (VITAMIN D3) 2,000 unit Tab Take 2,000 Units by mouth 2 (two) times daily.      omeprazole (PRILOSEC) 20 MG capsule Take 20 mg by mouth.       No facility-administered encounter medications on file as of 6/22/2020.        Past Medical History:  Patient Active Problem List   Diagnosis    Parkinsonism    Ptosis    Primary freezing of gait       Past Surgical History:  History reviewed. No pertinent surgical history.    Current Living Situation: home    Social:  Social History     Socioeconomic History    Marital status:      Spouse name: Not on file    Number of children: Not on file    Years of education: Not on file    Highest education level: Not on file   Occupational History    Not on file   Social Needs    Financial resource strain: Not on file    Food insecurity     Worry: Not on file     Inability: Not on file    Transportation needs     Medical: Not on file     Non-medical: Not on file   Tobacco Use    Smoking status: Never Smoker    Smokeless tobacco: Never Used   Substance and Sexual Activity    Alcohol use: Never     Frequency: Never    Drug use: Not on file    Sexual activity: Not on file   Lifestyle    Physical activity     Days per week: Not on file     Minutes per session: Not on file    Stress: Not on file   Relationships    Social connections     Talks on phone: Not on file     Gets together: Not on file     Attends [employer] service: Not on file     Active member of club or organization: Not on file     Attends meetings of clubs or organizations: Not on file     Relationship status: Not on file   Other Topics Concern    Not on file   Social History Narrative    Not on file       Family History:  History reviewed. No pertinent family history.    PHYSICAL:  BP (!) 155/76 (BP Location: Left arm, Patient Position: Sitting)    "Pulse 86   Ht 5' 10" (1.778 m)   Wt 111 kg (244 lb 13.1 oz)   BMI 35.13 kg/m²  Physical Exam  Constitutional: Well-developed, well-nourished, appears stated age  Eyes: No scleral icterus  ENT: Moist oral mucosa  Cardiovascular: No lower extremity edema   Respiratory: No labored breathing   Skin: No rash   Hematologic: No bruising  Psychiatric: No depression  Other: GI/ deferred   · Mental status: Alert and oriented to person, place, time, and situation;   · WORLD-DLROW; 5/5à 5/5 words, follows commands  · Speech: normal (not dysarthric), no aphasia  · Cranial nerves:            · CN II: Pupils mid-position and equal, not tested light or accommodation  · CN III, IV, VI: Extraocular movements full, no nystagmus visualized  · CN V: Not tested   · CN VII: Face strong  - L facial dystonia  · CN VIII: Hearing intact to voice and conversation   · CN IX, X: Palate raises midline and symmetric   · CN XI: Strong shoulder shrug B   · CN XII: Tongue appears midline   · Motor: Normal bulk by appearance, no drift   · Sensory: Not tested    · Gait: Prominent FOG otyherwise good stride  · Deep tendon reflexes: Not tested  · Movement/Coordination                    Moderate hypophonic speech.                     Moderate facial masking    Bradykinesia  ? Finger taps Finger flicks SCOTTIE Heel taps   Left 2+ 2+  -   Right 3+ 2+ - -     Tone R arm 2+    No tremor    Laboratory Data:  NA    Imaging:  Brain MRI per report NL - no disc avaialble    DATscan R>L decreased uptake    Assessment//Plan:   Problem List Items Addressed This Visit        Neuro    Parkinsonism    Current Assessment & Plan     Rigid-predominate PDism. Appears well does on his current regimen, with FOG out of proportionate to rigidity.  Due to leg swelling suggested decrease ropinirole to OFF slowly over 8 weeks  Suggested continue  Sinemet 25/100 1.5 tab PO TID  -Continue rasagiline 1 mg Qdaily  -Keep journal of PD symptoms/sinemet  -Use laser target for gait " (thids worked well in clinic today)     Suggested to restart agressive PT    Asked him to have his MRI brain uploaded to screen for signs of PSP given facial dystonia and FOG            Other    Primary freezing of gait    Current Assessment & Plan     Aggressive PT               Follow-up: 1 mos  Discussed the importance of ongoing exercise in efforts to improve mobility and balance.    Amanda Delgado MD, MS  [facility]  Department of Neurology  Movement Disorders

## 2020-08-07 ENCOUNTER — TELEPHONE (OUTPATIENT)
Dept: NEUROLOGY | Facility: CLINIC | Age: 80
End: 2020-08-07

## 2020-08-07 NOTE — TELEPHONE ENCOUNTER
----- Message from Amanda Delgado MD sent at 8/6/2020  8:16 PM CDT -----  No Tuesday scheduling until we figure out my DBS schedule. Next available otherwise is OK  ----- Message -----  From: Margie Varela MA  Sent: 8/5/2020   3:10 PM CDT  To: Amanda Delgado MD    Spoke with Mrs. Monterroso, she states you wanted to see Mr. Monterroso in the clinic in person. Do you want me to schedule Mr. Monterroso for 8/18/2020 at 10 am?

## 2020-08-07 NOTE — TELEPHONE ENCOUNTER
Spoke with Mrs. Monterroso, she was informed per Dr. Delgado appt can wait until next available. The next available is 9/29/2020 at 3 pm. Mrs. Monterroso verbalized understanding, accepting the appt.

## 2020-08-24 ENCOUNTER — TELEPHONE (OUTPATIENT)
Dept: NEUROLOGY | Facility: CLINIC | Age: 80
End: 2020-08-24

## 2020-08-24 DIAGNOSIS — G20.C PARKINSONISM, UNSPECIFIED PARKINSONISM TYPE: Primary | ICD-10-CM

## 2020-08-24 NOTE — TELEPHONE ENCOUNTER
----- Message from Latricia Hunter sent at 8/24/2020  1:48 PM CDT -----  Contact: Salma    [facility]    [phone]  Calling to get clarification on pts orders for PT.  Pls call.

## 2020-09-27 ENCOUNTER — PATIENT MESSAGE (OUTPATIENT)
Dept: NEUROLOGY | Facility: CLINIC | Age: 80
End: 2020-09-27

## 2020-09-29 ENCOUNTER — OFFICE VISIT (OUTPATIENT)
Dept: NEUROLOGY | Facility: CLINIC | Age: 80
End: 2020-09-29
Payer: MEDICARE

## 2020-09-29 VITALS
HEIGHT: 70 IN | DIASTOLIC BLOOD PRESSURE: 63 MMHG | SYSTOLIC BLOOD PRESSURE: 137 MMHG | BODY MASS INDEX: 35.13 KG/M2 | HEART RATE: 74 BPM

## 2020-09-29 DIAGNOSIS — G20.C PARKINSONISM, UNSPECIFIED PARKINSONISM TYPE: ICD-10-CM

## 2020-09-29 PROCEDURE — 99213 OFFICE O/P EST LOW 20 MIN: CPT | Mod: PBBFAC | Performed by: PSYCHIATRY & NEUROLOGY

## 2020-09-29 PROCEDURE — 99214 PR OFFICE/OUTPT VISIT, EST, LEVL IV, 30-39 MIN: ICD-10-PCS | Mod: S$PBB,,, | Performed by: PSYCHIATRY & NEUROLOGY

## 2020-09-29 PROCEDURE — 99214 OFFICE O/P EST MOD 30 MIN: CPT | Mod: S$PBB,,, | Performed by: PSYCHIATRY & NEUROLOGY

## 2020-09-29 PROCEDURE — 99999 PR PBB SHADOW E&M-EST. PATIENT-LVL III: CPT | Mod: PBBFAC,,, | Performed by: PSYCHIATRY & NEUROLOGY

## 2020-09-29 PROCEDURE — 99999 PR PBB SHADOW E&M-EST. PATIENT-LVL III: ICD-10-PCS | Mod: PBBFAC,,, | Performed by: PSYCHIATRY & NEUROLOGY

## 2020-09-29 RX ORDER — CARBIDOPA AND LEVODOPA 25; 100 MG/1; MG/1
2 TABLET ORAL 4 TIMES DAILY
Qty: 120 TABLET | Refills: 11
Start: 2020-09-29 | End: 2021-07-19 | Stop reason: SDUPTHER

## 2020-09-29 NOTE — PROGRESS NOTES
"MOVEMENT DISORDERS CLINIC Follow-up NOTE    PCP/Referring Provider: No referring provider defined for this encounter.  Date of Service: 9/29/2020    Chief Complaint: FOG    Interval Hx    Since last visit,   Decreased ropinirole - gait did not decline - leg swelling stopped immediately    He has not been taking pills on time    -Continues Sinemet 25/100 1.5 tab PO QID  -Continues rasagiline 1 mg Qdaily  Stopped rock steady boxing and PT    Does PT 3 days a week  Falls once a month  Uses laser cane and this helps  Uses a cane and walker    DATscan brought in today shows diminished uptake R    Falls: None    Thinks he doesn't have OFFtime but his voice comes and goes  No dyskinesias, no orthostasis    PD Review of Symptoms:  Anosmia: "hes's never had one"  Dysarthria/Hypophonia: Severe  Dysphagia/Sialorrhea: Yes to both  Hallucinations: None  Depression: None  Cognitive slowing: None  Impulsivity: None  Urinary changes: Frequency  Constipation: None  Orthostasis: None  Dyskinesia: None  Falls: As above  Freezing: Yes  Micrographia: Yes  Sleep issues:  -ADELINA: wears cpap  -RBD: None   -Sleep Quality: at times OK    "priorHPI:  2/17/20 Interval History    Currently started sinemet 1 tab TID and noticed improvement in his gait (no change in freezing) - wife describes decreased shuffling.  Ropinirole was decreased tablets from 1 mg  3, 3, 2 tabs -> 3, 2, 2 tabs and noticed that gait subsequently worsened again.     "Prior HPI  Braulio Monterroso is a R HANDED 80 y.o. male with a medical issues significant for Pes, HTN, ADELINA, Cspine dz s/p surgery, hip replacement, who presents with Parkinson's with new onset FOG [facility]. He notes 10 years ago progressive dysphagia and hypophonia, followed by a shuffling gait at age 76. This started with a L-sided foot drag. He currently walks with a cane and can go half a mile with stops. Without stops he can go 1 block. After Jan 2018 he started having FOG spells. He " "has near constant FOG art this point. He currently uses a walker at home and a cane. He falls once a month usually forwards.     Started carbidopa/levodopa 1 year ago but after he started getting migraines.  He's never had a clear ONtime  He currently takes ropinirole 3/3/2mg  Rasagiline 1mg QHS  He has ankle swelling  No memory loss  No orthostasis  No impulsiveness    Goes to speech therapy and gets injections in his vocal cords    Does rock steady boxing  He rides a stationary bike"      Review of Systems:   Review of Systems   Constitutional: Negative for fever.   HENT: Negative for congestion.    Eyes: Negative for double vision.   Respiratory: Negative for cough and shortness of breath.    Cardiovascular: Negative for chest pain and leg swelling.   Gastrointestinal: Negative for nausea.   Genitourinary: Negative for dysuria.   Musculoskeletal: Positive for falls.   Skin: Negative for rash.   Neurological: Positive for tremors and speech change. Negative for headaches.   Psychiatric/Behavioral: Negative for depression.       Neuroleptic exposure:  None    Current Medications:  Outpatient Encounter Medications as of 9/29/2020   Medication Sig Dispense Refill    carbidopa-levodopa  mg (SINEMET)  mg per tablet Take 2 tablets by mouth 4 (four) times daily. 120 tablet 11    hydroCHLOROthiazide (HYDRODIURIL) 25 MG tablet Take 25 mg by mouth.      losartan (COZAAR) 50 MG tablet Take 50 mg by mouth.      melatonin 3 mg Cap Take 3 mg by mouth as needed.      metoprolol tartrate (LOPRESSOR) 25 MG tablet Take 25 mg by mouth 2 (two) times daily.      rasagiline (AZILECT) 1 mg Tab Take 1 mg by mouth once daily.      rosuvastatin (CRESTOR) 5 MG tablet Take 2.5 mg by mouth.      warfarin (COUMADIN) 5 MG tablet Take 5 mg by mouth.      [DISCONTINUED] carbidopa-levodopa  mg (SINEMET)  mg per tablet Take 1.5 tablets by mouth 4 (four) times daily. 120 tablet 11    cholecalciferol, vitamin D3, " "(VITAMIN D3) 2,000 unit Tab Take 2,000 Units by mouth 2 (two) times daily.      omeprazole (PRILOSEC) 20 MG capsule Take 20 mg by mouth.      rOPINIRole (REQUIP) 1 MG tablet Take 1 mg by mouth 3 (three) times daily. Take 3 tablets by mouth 2 times a day and take 2 tablets at bedtime       No facility-administered encounter medications on file as of 9/29/2020.        Past Medical History:  Patient Active Problem List   Diagnosis    Parkinsonism    Ptosis    Primary freezing of gait       Past Surgical History:  No past surgical history on file.    Current Living Situation: home    Social:  Social History     Socioeconomic History    Marital status:      Spouse name: Not on file    Number of children: Not on file    Years of education: Not on file    Highest education level: Not on file   Occupational History    Not on file   Social Needs    Financial resource strain: Not on file    Food insecurity     Worry: Not on file     Inability: Not on file    Transportation needs     Medical: Not on file     Non-medical: Not on file   Tobacco Use    Smoking status: Never Smoker    Smokeless tobacco: Never Used   Substance and Sexual Activity    Alcohol use: Never     Frequency: Never    Drug use: Not on file    Sexual activity: Not on file   Lifestyle    Physical activity     Days per week: Not on file     Minutes per session: Not on file    Stress: Not on file   Relationships    Social connections     Talks on phone: Not on file     Gets together: Not on file     Attends [employer] service: Not on file     Active member of club or organization: Not on file     Attends meetings of clubs or organizations: Not on file     Relationship status: Not on file   Other Topics Concern    Not on file   Social History Narrative    Not on file       Family History:  No family history on file.    PHYSICAL:  /63 (BP Location: Right arm, Patient Position: Sitting)   Pulse 74   Ht 5' 10" (1.778 m)   BMI " 35.13 kg/m²  Physical Exam  Constitutional: Well-developed, well-nourished, appears stated age  Eyes: No scleral icterus  ENT: Moist oral mucosa  Cardiovascular: No lower extremity edema   Respiratory: No labored breathing   Skin: No rash   Hematologic: No bruising  Psychiatric: No depression  Other: GI/ deferred   · Mental status: Alert and oriented to person, place, time, and situation;   · WORLD-DLROW; 5/5à 5/5 words, follows commands  · Speech: normal (not dysarthric), no aphasia  · Cranial nerves:            · CN II: Pupils mid-position and equal, not tested light or accommodation  · CN III, IV, VI: Extraocular movements full, no nystagmus visualized  · CN V: Not tested   · CN VII: Face strong  - L facial dystonia  · CN VIII: Hearing intact to voice and conversation   · CN IX, X: Palate raises midline and symmetric   · CN XI: Strong shoulder shrug B   · CN XII: Tongue appears midline   · Motor: Normal bulk by appearance, no drift   · Sensory: Not tested    · Gait: Prominent FOG otyherwise good stride  · Deep tendon reflexes: Not tested  · Movement/Coordination                    Moderate hypophonic speech.                     Moderate facial masking    Bradykinesia  ? Finger taps Finger flicks SCOTTIE Heel taps   Left 2+ 2+  -   Right 3+ 2+ - -     Tone R arm 2+    No tremor    Laboratory Data:  NA    Imaging:  Brain MRI per report NL - no disc avaialble    DATscan R>L decreased uptake    Assessment//Plan:   Problem List Items Addressed This Visit        Neuro    Parkinsonism    Current Assessment & Plan     Rigid-predominate PDism. Appears well does on his current regimen, with FOG out of proportionate to rigidity.  Due to leg swelling suggested decrease ropinirole to OFF slowly over 8 weeks and this immediately got better  Suggested increase  Sinemet 25/100 2 tab PO QID  -Continue rasagiline 1 mg Qdaily  -Keep journal of PD symptoms/sinemet  -Continue Use laser target for gait      Suggested to restart  cheriessive PT    Asked him to have his MRI brain uploaded to screen for signs of PSP given facial dystonia and FOG         Relevant Medications    carbidopa-levodopa  mg (SINEMET)  mg per tablet        Amanda Delgado MD, MS [facility]  Department of Neurology  Movement Disorders

## 2020-09-29 NOTE — ASSESSMENT & PLAN NOTE
Rigid-predominate PDism. Appears well does on his current regimen, with FOG out of proportionate to rigidity.  Due to leg swelling suggested decrease ropinirole to OFF slowly over 8 weeks and this immediately got better  Suggested increase  Sinemet 25/100 2 tab PO QID  -Continue rasagiline 1 mg Qdaily  -Keep journal of PD symptoms/sinemet  -Continue Use laser target for gait      Suggested to restart agressive PT    Asked him to have his MRI brain uploaded to screen for signs of PSP given facial dystonia and FOG

## 2020-11-08 ENCOUNTER — PATIENT MESSAGE (OUTPATIENT)
Dept: NEUROLOGY | Facility: CLINIC | Age: 80
End: 2020-11-08

## 2020-12-07 ENCOUNTER — PATIENT MESSAGE (OUTPATIENT)
Dept: NEUROLOGY | Facility: CLINIC | Age: 80
End: 2020-12-07

## 2020-12-18 ENCOUNTER — TELEPHONE (OUTPATIENT)
Dept: NEUROLOGY | Facility: CLINIC | Age: 80
End: 2020-12-18

## 2020-12-18 NOTE — TELEPHONE ENCOUNTER
----- Message from Latricia Hunter sent at 12/18/2020  2:51 PM CST -----  Contact: Salma    [facility]    [phone]  Calling for the status of pts plan of care that was faxed several times.  Pls call.

## 2020-12-30 ENCOUNTER — OFFICE VISIT (OUTPATIENT)
Dept: NEUROLOGY | Facility: CLINIC | Age: 80
End: 2020-12-30
Payer: MEDICARE

## 2020-12-30 DIAGNOSIS — G20.C PARKINSONISM, UNSPECIFIED PARKINSONISM TYPE: ICD-10-CM

## 2020-12-30 DIAGNOSIS — I95.1 ORTHOSTASIS: ICD-10-CM

## 2020-12-30 PROCEDURE — 99215 OFFICE O/P EST HI 40 MIN: CPT | Mod: 95,,, | Performed by: PSYCHIATRY & NEUROLOGY

## 2020-12-30 PROCEDURE — 99215 PR OFFICE/OUTPT VISIT, EST, LEVL V, 40-54 MIN: ICD-10-PCS | Mod: 95,,, | Performed by: PSYCHIATRY & NEUROLOGY

## 2020-12-30 NOTE — PROGRESS NOTES
"The patient location is: home  The chief complaint leading to consultation is: PD    Visit type: audiovisual     Face to Face time with patient: 20  20 minutes of total time spent on the encounter, which includes face to face time and non-face to face time preparing to see the patient (eg, review of tests), Obtaining and/or reviewing separately obtained history, Documenting clinical information in the electronic or other health record, Independently interpreting results (not separately reported) and communicating results to the patient/family/caregiver, or Care coordination (not separately reported).         Each patient to whom he or she provides medical services by telemedicine is:  (1) informed of the relationship between the physician and patient and the respective role of any other health care provider with respect to management of the patient; and (2) notified that he or she may decline to receive medical services by telemedicine and may withdraw from such care at any time.    Notes:       [facility] Follow-up NOTE    PCP/Referring Provider: No referring provider defined for this encounter.  Date of Service: 12/30/2020    Chief Complaint: FOG    Interval Hx    Since last visit,   Decreased ropinirole to off and his leg swelling stopped  Wife thinks he continues to decline with gait and speech    -Continues Sinemet 25/100 2 tabs PO QID  -Continues rasagiline 1 mg Qdaily  Stopped rock steady boxing and PT    Does PT 3 days a week  Falls once a month  Uses laser cane and this helps  Uses a cane and walker    DATscan   We still have no MRI brain -not uploaded    Falls: None    Thinks he doesn't have OFFtime but his voice comes and goes  No dyskinesias, no orthostasis    PD Review of Symptoms:  Anosmia: "hes's never had one"  Dysarthria/Hypophonia: Severe  Dysphagia/Sialorrhea: Yes to both  Hallucinations: None  Depression: None  Cognitive slowing: None  Impulsivity: None  Urinary changes: " "Frequency  Constipation: None  Orthostasis: None  Dyskinesia: None  Falls: As above  Freezing: Yes  Micrographia: Yes  Sleep issues:  -ADELINA: wears cpap  -RBD: None   -Sleep Quality: at times OK    "priorHPI:  2/17/20 Interval History    Currently started sinemet 1 tab TID and noticed improvement in his gait (no change in freezing) - wife describes decreased shuffling.  Ropinirole was decreased tablets from 1 mg  3, 3, 2 tabs -> 3, 2, 2 tabs and noticed that gait subsequently worsened again.     "Prior HPI  Braulio Monterroso is a R HANDED 80 y.o. male with a medical issues significant for Pes, HTN, ADELINA, Cspine dz s/p surgery, hip replacement, who presents with Parkinson's with new onset FOG [facility]. He notes 10 years ago progressive dysphagia and hypophonia, followed by a shuffling gait at age 76. This started with a L-sided foot drag. He currently walks with a cane and can go half a mile with stops. Without stops he can go 1 block. After Jan 2018 he started having FOG spells. He has near constant FOG art this point. He currently uses a walker at home and a cane. He falls once a month usually forwards.     Started carbidopa/levodopa 1 year ago but after he started getting migraines.  He's never had a clear ONtime  He currently takes ropinirole 3/3/2mg  Rasagiline 1mg QHS  He has ankle swelling  No memory loss  No orthostasis  No impulsiveness    Goes to speech therapy and gets injections in his vocal cords    Does rock steady boxing  He rides a stationary bike"      Review of Systems:   Review of Systems   Constitutional: Negative for fever.   HENT: Negative for congestion.    Eyes: Negative for double vision.   Respiratory: Negative for cough and shortness of breath.    Cardiovascular: Negative for chest pain and leg swelling.   Gastrointestinal: Negative for nausea.   Genitourinary: Negative for dysuria.   Musculoskeletal: Positive for falls.   Skin: Negative for rash.   Neurological: " Positive for tremors and speech change. Negative for headaches.   Psychiatric/Behavioral: Negative for depression.       Neuroleptic exposure:  None    Current Medications:  Outpatient Encounter Medications as of 12/30/2020   Medication Sig Dispense Refill    carbidopa-levodopa  mg (SINEMET)  mg per tablet Take 2 tablets by mouth 4 (four) times daily. 120 tablet 11    cholecalciferol, vitamin D3, (VITAMIN D3) 2,000 unit Tab Take 2,000 Units by mouth 2 (two) times daily.      hydroCHLOROthiazide (HYDRODIURIL) 25 MG tablet Take 25 mg by mouth.      losartan (COZAAR) 50 MG tablet Take 50 mg by mouth.      melatonin 3 mg Cap Take 3 mg by mouth as needed.      metoprolol tartrate (LOPRESSOR) 25 MG tablet Take 25 mg by mouth 2 (two) times daily.      omeprazole (PRILOSEC) 20 MG capsule Take 20 mg by mouth.      rasagiline (AZILECT) 1 mg Tab Take 1 mg by mouth once daily.      rOPINIRole (REQUIP) 1 MG tablet Take 1 mg by mouth 3 (three) times daily. Take 3 tablets by mouth 2 times a day and take 2 tablets at bedtime      rosuvastatin (CRESTOR) 5 MG tablet Take 2.5 mg by mouth.      warfarin (COUMADIN) 5 MG tablet Take 5 mg by mouth.       No facility-administered encounter medications on file as of 12/30/2020.        Past Medical History:  Patient Active Problem List   Diagnosis    Parkinsonism    Ptosis    Primary freezing of gait    Orthostasis       Past Surgical History:  No past surgical history on file.    Current Living Situation: home    Social:  Social History     Socioeconomic History    Marital status:      Spouse name: Not on file    Number of children: Not on file    Years of education: Not on file    Highest education level: Not on file   Occupational History    Not on file   Social Needs    Financial resource strain: Not on file    Food insecurity     Worry: Not on file     Inability: Not on file    Transportation needs     Medical: Not on file     Non-medical: Not  on file   Tobacco Use    Smoking status: Never Smoker    Smokeless tobacco: Never Used   Substance and Sexual Activity    Alcohol use: Never     Frequency: Never    Drug use: Not on file    Sexual activity: Not on file   Lifestyle    Physical activity     Days per week: Not on file     Minutes per session: Not on file    Stress: Not on file   Relationships    Social connections     Talks on phone: Not on file     Gets together: Not on file     Attends [employer] service: Not on file     Active member of club or organization: Not on file     Attends meetings of clubs or organizations: Not on file     Relationship status: Not on file   Other Topics Concern    Not on file   Social History Narrative    Not on file       Family History:  No family history on file.    PHYSICAL:  There were no vitals taken for this visit. Physical Exam  Constitutional: Well-developed, well-nourished, appears stated age  Eyes: No scleral icterus  ENT: Moist oral mucosa  Cardiovascular: No lower extremity edema   Respiratory: No labored breathing   Skin: No rash   Hematologic: No bruising  Psychiatric: No depression  Other: GI/ deferred   · Mental status: Alert and oriented to person, place, time, and situation;   · WORLD-DLROW; 5/5à 5/5 words, follows commands  · Speech: normal (not dysarthric), no aphasia  · Cranial nerves:            · CN II: Pupils mid-position and equal, not tested light or accommodation  · CN III, IV, VI: Extraocular movements full, no nystagmus visualized  · CN V: Not tested   · CN VII: Face strong  - L facial dystonia  · CN VIII: Hearing intact to voice and conversation   · CN IX, X: Palate raises midline and symmetric   · CN XI: Strong shoulder shrug B   · CN XII: Tongue appears midline   · Motor: Normal bulk by appearance, no drift   · Sensory: Not tested    · Gait: Prominent FOG otyherwise good stride  · Deep tendon reflexes: Not tested  · Movement/Coordination                    Moderate hypophonic  speech.                     Moderate facial masking    Bradykinesia  ? Finger taps Finger flicks SCOTTIE Heel taps   Left 2+ 2+  -   Right 3+ 2+ - -     Tone R arm 2+    No tremor    Laboratory Data:  NA    Imaging:  Brain MRI per report NL - no disc avaialble    DATscan R>L decreased uptake    Assessment//Plan:   Problem List Items Addressed This Visit        Neuro    Parkinsonism    Current Assessment & Plan     Rigid-predominate PDism. Appears under dosed on his current regimen, with FOG out of proportionate to rigidity.    Suggested continue  Sinemet 25/100 2 tab PO QID until orthostasis is assessed  -Continue rasagiline 1 mg Qdaily  -Keep journal of PD symptoms/sinemet  -Continue Use laser target for gait      Suggested to agressive PT    Asked him to have his MRI brain uploaded to screen for signs of PSP given facial dystonia and FOG            Cardiac/Vascular    Orthostasis    Current Assessment & Plan     Check orthos x 1 week             Amanda Delgado MD, MS  [facility]  Department of Neurology  Movement Disorders

## 2020-12-30 NOTE — ASSESSMENT & PLAN NOTE
Rigid-predominate PDism. Appears under dosed on his current regimen, with FOG out of proportionate to rigidity.    Suggested continue  Sinemet 25/100 2 tab PO QID until orthostasis is assessed  -Continue rasagiline 1 mg Qdaily  -Keep journal of PD symptoms/sinemet  -Continue Use laser target for gait      Suggested to marbin PT    Asked him to have his MRI brain uploaded to screen for signs of PSP given facial dystonia and FOG

## 2021-01-08 ENCOUNTER — PATIENT MESSAGE (OUTPATIENT)
Dept: NEUROLOGY | Facility: CLINIC | Age: 81
End: 2021-01-08

## 2021-04-05 ENCOUNTER — TELEPHONE (OUTPATIENT)
Dept: NEUROLOGY | Facility: CLINIC | Age: 81
End: 2021-04-05

## 2021-04-12 ENCOUNTER — PATIENT MESSAGE (OUTPATIENT)
Dept: NEUROLOGY | Facility: CLINIC | Age: 81
End: 2021-04-12

## 2021-04-15 ENCOUNTER — OFFICE VISIT (OUTPATIENT)
Dept: NEUROLOGY | Facility: CLINIC | Age: 81
End: 2021-04-15
Payer: COMMERCIAL

## 2021-04-15 ENCOUNTER — PATIENT MESSAGE (OUTPATIENT)
Dept: NEUROLOGY | Facility: CLINIC | Age: 81
End: 2021-04-15

## 2021-04-15 VITALS
HEART RATE: 84 BPM | HEIGHT: 70 IN | BODY MASS INDEX: 32.89 KG/M2 | DIASTOLIC BLOOD PRESSURE: 72 MMHG | WEIGHT: 229.75 LBS | SYSTOLIC BLOOD PRESSURE: 161 MMHG

## 2021-04-15 DIAGNOSIS — G20.C PARKINSONISM, UNSPECIFIED PARKINSONISM TYPE: Primary | ICD-10-CM

## 2021-04-15 PROCEDURE — 99999 PR PBB SHADOW E&M-EST. PATIENT-LVL III: ICD-10-PCS | Mod: PBBFAC,,, | Performed by: PSYCHIATRY & NEUROLOGY

## 2021-04-15 PROCEDURE — 99214 OFFICE O/P EST MOD 30 MIN: CPT | Mod: S$GLB,,, | Performed by: PSYCHIATRY & NEUROLOGY

## 2021-04-15 PROCEDURE — 99213 OFFICE O/P EST LOW 20 MIN: CPT | Mod: PBBFAC | Performed by: PSYCHIATRY & NEUROLOGY

## 2021-04-15 PROCEDURE — 99999 PR PBB SHADOW E&M-EST. PATIENT-LVL III: CPT | Mod: PBBFAC,,, | Performed by: PSYCHIATRY & NEUROLOGY

## 2021-04-15 PROCEDURE — 99214 PR OFFICE/OUTPT VISIT, EST, LEVL IV, 30-39 MIN: ICD-10-PCS | Mod: S$GLB,,, | Performed by: PSYCHIATRY & NEUROLOGY

## 2021-05-03 ENCOUNTER — HOSPITAL ENCOUNTER (OUTPATIENT)
Dept: RADIOLOGY | Facility: HOSPITAL | Age: 81
Discharge: HOME | End: 2021-05-03
Attending: PSYCHIATRY & NEUROLOGY
Payer: MEDICARE

## 2021-05-03 DIAGNOSIS — G20.C PARKINSONISM, UNSPECIFIED PARKINSONISM TYPE: ICD-10-CM

## 2021-05-03 PROCEDURE — 70551 MRI BRAIN WITHOUT CONTRAST: ICD-10-PCS | Mod: 26,,, | Performed by: RADIOLOGY

## 2021-05-03 PROCEDURE — 70551 MRI BRAIN STEM W/O DYE: CPT | Mod: TC,PO

## 2021-05-03 PROCEDURE — 70551 MRI BRAIN STEM W/O DYE: CPT | Mod: 26,,, | Performed by: RADIOLOGY

## 2021-05-14 ENCOUNTER — PATIENT MESSAGE (OUTPATIENT)
Dept: NEUROLOGY | Facility: CLINIC | Age: 81
End: 2021-05-14

## 2021-07-19 ENCOUNTER — OFFICE VISIT (OUTPATIENT)
Dept: NEUROLOGY | Facility: CLINIC | Age: 81
End: 2021-07-19
Payer: MEDICARE

## 2021-07-19 VITALS — WEIGHT: 229.75 LBS | BODY MASS INDEX: 32.96 KG/M2

## 2021-07-19 DIAGNOSIS — G20.C PARKINSONISM, UNSPECIFIED PARKINSONISM TYPE: ICD-10-CM

## 2021-07-19 PROCEDURE — 99999 PR PBB SHADOW E&M-EST. PATIENT-LVL II: CPT | Mod: PBBFAC,,, | Performed by: PSYCHIATRY & NEUROLOGY

## 2021-07-19 PROCEDURE — 99999 PR PBB SHADOW E&M-EST. PATIENT-LVL II: ICD-10-PCS | Mod: PBBFAC,,, | Performed by: PSYCHIATRY & NEUROLOGY

## 2021-07-19 PROCEDURE — 99214 OFFICE O/P EST MOD 30 MIN: CPT | Mod: S$GLB,,, | Performed by: PSYCHIATRY & NEUROLOGY

## 2021-07-19 PROCEDURE — 99212 OFFICE O/P EST SF 10 MIN: CPT | Mod: PBBFAC | Performed by: PSYCHIATRY & NEUROLOGY

## 2021-07-19 PROCEDURE — 99214 PR OFFICE/OUTPT VISIT, EST, LEVL IV, 30-39 MIN: ICD-10-PCS | Mod: S$GLB,,, | Performed by: PSYCHIATRY & NEUROLOGY

## 2021-07-19 RX ORDER — CARBIDOPA AND LEVODOPA 25; 100 MG/1; MG/1
2 TABLET ORAL 4 TIMES DAILY
Qty: 120 TABLET | Refills: 11
Start: 2021-07-19 | End: 2021-07-19

## 2021-07-19 RX ORDER — CARBIDOPA AND LEVODOPA 25; 100 MG/1; MG/1
3 TABLET ORAL 4 TIMES DAILY
Qty: 120 TABLET | Refills: 11
Start: 2021-07-19 | End: 2022-07-15 | Stop reason: SDUPTHER

## 2021-09-03 ENCOUNTER — PATIENT MESSAGE (OUTPATIENT)
Dept: NEUROLOGY | Facility: CLINIC | Age: 81
End: 2021-09-03

## 2021-09-15 ENCOUNTER — PATIENT MESSAGE (OUTPATIENT)
Dept: NEUROLOGY | Facility: CLINIC | Age: 81
End: 2021-09-15

## 2021-09-27 ENCOUNTER — PATIENT MESSAGE (OUTPATIENT)
Dept: NEUROLOGY | Facility: CLINIC | Age: 81
End: 2021-09-27

## 2021-10-18 ENCOUNTER — OFFICE VISIT (OUTPATIENT)
Dept: NEUROLOGY | Facility: CLINIC | Age: 81
End: 2021-10-18
Payer: COMMERCIAL

## 2021-10-18 DIAGNOSIS — G20.C PARKINSONISM, UNSPECIFIED PARKINSONISM TYPE: Primary | ICD-10-CM

## 2021-10-18 PROCEDURE — 99214 PR OFFICE/OUTPT VISIT, EST, LEVL IV, 30-39 MIN: ICD-10-PCS | Mod: 95,,, | Performed by: PSYCHIATRY & NEUROLOGY

## 2021-10-18 PROCEDURE — 99214 OFFICE O/P EST MOD 30 MIN: CPT | Mod: 95,,, | Performed by: PSYCHIATRY & NEUROLOGY

## 2021-10-18 RX ORDER — DONEPEZIL HYDROCHLORIDE 5 MG/1
5 TABLET, FILM COATED ORAL NIGHTLY
Qty: 30 TABLET | Refills: 11 | Status: SHIPPED | OUTPATIENT
Start: 2021-10-18 | End: 2022-01-21 | Stop reason: SDUPTHER

## 2022-01-21 ENCOUNTER — OFFICE VISIT (OUTPATIENT)
Dept: NEUROLOGY | Facility: CLINIC | Age: 82
End: 2022-01-21
Payer: MEDICARE

## 2022-01-21 VITALS
BODY MASS INDEX: 32.17 KG/M2 | SYSTOLIC BLOOD PRESSURE: 162 MMHG | WEIGHT: 224.19 LBS | HEART RATE: 73 BPM | DIASTOLIC BLOOD PRESSURE: 83 MMHG

## 2022-01-21 DIAGNOSIS — G20.C PARKINSONISM, UNSPECIFIED PARKINSONISM TYPE: ICD-10-CM

## 2022-01-21 PROCEDURE — 99213 PR OFFICE/OUTPT VISIT, EST, LEVL III, 20-29 MIN: ICD-10-PCS | Mod: S$PBB,,, | Performed by: PSYCHIATRY & NEUROLOGY

## 2022-01-21 PROCEDURE — 99213 OFFICE O/P EST LOW 20 MIN: CPT | Mod: S$PBB,,, | Performed by: PSYCHIATRY & NEUROLOGY

## 2022-01-21 PROCEDURE — 99999 PR PBB SHADOW E&M-EST. PATIENT-LVL III: CPT | Mod: PBBFAC,,, | Performed by: PSYCHIATRY & NEUROLOGY

## 2022-01-21 PROCEDURE — 99999 PR PBB SHADOW E&M-EST. PATIENT-LVL III: ICD-10-PCS | Mod: PBBFAC,,, | Performed by: PSYCHIATRY & NEUROLOGY

## 2022-01-21 PROCEDURE — 99213 OFFICE O/P EST LOW 20 MIN: CPT | Mod: PBBFAC | Performed by: PSYCHIATRY & NEUROLOGY

## 2022-01-21 RX ORDER — DONEPEZIL HYDROCHLORIDE 5 MG/1
5 TABLET, FILM COATED ORAL NIGHTLY
Qty: 30 TABLET | Refills: 11 | Status: SHIPPED | OUTPATIENT
Start: 2022-01-21 | End: 2022-07-15

## 2022-01-21 NOTE — PROGRESS NOTES
"[facility] DISORD[facility] Follow-up NOTE    PCP/Referring Provider: No referring provider defined for this encounter.  Date of Service: 1/21/2022    Chief Complaint: FOG    Interval Hx    FOG continues - tried carbidopa/levodopa 25/100mg 3 tab PO TID but felt off so stopped  Continues carbidopa/levodopa 25/100mg 2 tab PO QID     Back to rock steady boxing    Added donepezil  5mg but QAM rather than QHS and "felt funny" so stopped  -Continues rasagiline 1 mg Qdaily    Doing rock steady and PT for his HIP    Speech is still soft - has speech devices but does not use it    Uses laser cane and this helps  Uses a cane and walker  Freezing when going through doors    Choking on his food - did a swallow study- this is stable and he is avoiding things like steak and stringy meats    MRI brain showed no hummingbird sign    Falls: one    Thinks he doesn't have OFFtime but his voice comes and goes  No dyskinesias, no orthostasis    PD Review of Symptoms:  Anosmia: "hes's never had one"  Dysarthria/Hypophonia: Severe  Dysphagia/Sialorrhea: Yes to both- see above  Hallucinations: None  Depression: None  Cognitive slowing: None  Impulsivity: None  Urinary changes: Frequency  Constipation: None  Orthostasis: None  Dyskinesia: None  Falls: As above  Freezing: Yes  Micrographia: Yes  Sleep issues:  -ADELINA: wears cpap  -RBD: None   -Sleep Quality: at times OK    "priorHPI:  2/17/20 Interval History    Currently started sinemet 1 tab TID and noticed improvement in his gait (no change in freezing) - wife describes decreased shuffling.  Ropinirole was decreased tablets from 1 mg  3, 3, 2 tabs -> 3, 2, 2 tabs and noticed that gait subsequently worsened again.     "Prior HPI  Braulio Monterroso is a R HANDED 81 y.o. male with a medical issues significant for Pes, HTN, ADELINA, Cspine dz s/p surgery, hip replacement, who presents with Parkinson's with new onset FOG [facility]. He notes 10 years ago progressive dysphagia and " "hypophonia, followed by a shuffling gait at age 76. This started with a L-sided foot drag. He currently walks with a cane and can go half a mile with stops. Without stops he can go 1 block. After Jan 2018 he started having FOG spells. He has near constant FOG art this point. He currently uses a walker at home and a cane. He falls once a month usually forwards.     Started carbidopa/levodopa 1 year ago but after he started getting migraines.  He's never had a clear ONtime  He currently takes ropinirole 3/3/2mg  Rasagiline 1mg QHS  He has ankle swelling  No memory loss  No orthostasis  No impulsiveness    Goes to speech therapy and gets injections in his vocal cords    Does rock steady boxing  He rides a stationary bike"      Review of Systems:   Review of Systems   Constitutional: Negative for fever.   HENT: Negative for congestion.    Eyes: Negative for double vision.   Respiratory: Negative for cough and shortness of breath.    Cardiovascular: Negative for chest pain and leg swelling.   Gastrointestinal: Negative for nausea.   Genitourinary: Negative for dysuria.   Musculoskeletal: Positive for falls.   Skin: Negative for rash.   Neurological: Positive for tremors and speech change. Negative for headaches.   Psychiatric/Behavioral: Negative for depression.       Neuroleptic exposure:  None    Current Medications:  Outpatient Encounter Medications as of 1/21/2022   Medication Sig Dispense Refill    carbidopa-levodopa  mg (SINEMET)  mg per tablet Take 3 tablets by mouth 4 (four) times daily. 120 tablet 11    cholecalciferol, vitamin D3, (VITAMIN D3) 50 mcg (2,000 unit) Tab Take 2,000 Units by mouth 2 (two) times daily.      hydroCHLOROthiazide (HYDRODIURIL) 25 MG tablet Take 25 mg by mouth.      losartan (COZAAR) 50 MG tablet Take 50 mg by mouth.      melatonin 3 mg Cap Take 3 mg by mouth as needed.      metoprolol tartrate (LOPRESSOR) 25 MG tablet Take 25 mg by mouth 2 (two) times daily.      " omeprazole (PRILOSEC) 20 MG capsule Take 20 mg by mouth.      rasagiline (AZILECT) 1 mg Tab Take 1 mg by mouth once daily.      rosuvastatin (CRESTOR) 5 MG tablet Take 2.5 mg by mouth.      warfarin (COUMADIN) 5 MG tablet Take 5 mg by mouth.      [DISCONTINUED] donepeziL (ARICEPT) 5 MG tablet Take 1 tablet (5 mg total) by mouth every evening. 30 tablet 11    donepeziL (ARICEPT) 5 MG tablet Take 1 tablet (5 mg total) by mouth every evening. 30 tablet 11     No facility-administered encounter medications on file as of 1/21/2022.       Past Medical History:  Patient Active Problem List   Diagnosis    Parkinsonism    Ptosis    Primary freezing of gait    Orthostasis       Past Surgical History:  No past surgical history on file.    Current Living Situation: home    Social:  Social History     Socioeconomic History    Marital status:    Tobacco Use    Smoking status: Never Smoker    Smokeless tobacco: Never Used   Substance and Sexual Activity    Alcohol use: Never       Family History:  No family history on file.    PHYSICAL:  BP (!) 162/83   Pulse 73   Wt 101.7 kg (224 lb 3.3 oz)   BMI 32.17 kg/m²  Physical Exam  Constitutional: Well-developed, well-nourished, appears stated age  Eyes: No scleral icterus  ENT: Moist oral mucosa  Cardiovascular: No lower extremity edema   Respiratory: No labored breathing   Skin: No rash   Hematologic: No bruising  Psychiatric: No depression  Other: GI/ deferred   · Mental status: Alert and oriented to person, place, time, and situation;   · WORLD-DLROW; 5/5à 5/5 words, follows commands  · Speech: normal (not dysarthric), no aphasia  · Cranial nerves:            · CN II: Pupils mid-position and equal, not tested light or accommodation- choppy saccades= POS SWJs - no gaze restrictions  · CN III, IV, VI: Extraocular movements full, no nystagmus visualized  · CN V: Not tested   · CN VII: Face strong  - L facial dystonia  · CN VIII: Hearing intact to voice and  conversation   · CN IX, X: Palate raises midline and symmetric   · CN XI: Strong shoulder shrug B   · CN XII: Tongue appears midline   · Motor: Normal bulk by appearance, no drift   · Sensory: Not tested    · Gait: Prominent FOG otherwise good stride  · Deep tendon reflexes: Not tested  · Movement/Coordination                    Severe hypophonic speech.                     Moderate facial masking    Bradykinesia  ? Finger taps Finger flicks SCOTTIE Heel taps   Left 2+ 1+  1+   Right 3+ 1+ - 2     No tremor    Laboratory Data:  NA    Imaging:  MRI Brain shows no hummingbird sign    DATscan R>L decreased uptake    Assessment//Plan:   Problem List Items Addressed This Visit        Neuro    Parkinsonism    Current Assessment & Plan     Rigid-predominate PDism. Appears under dosed on his current regimen, with FOG out of proportionate to rigidity however this appeared years after PDism.    Continues carbidopa/levodopa 25/100mg 2 tab PO QID     -Continue rasagiline 1 mg Qdaily  Suggested agressive PT - he continies rock steady boxing    Suggested re- aadd aricept 5mg but QHS for gait improvement  No hx arrythmias               Amanda Delgado MD, MS  [facility]  Department of Neurology  Movement Disorders

## 2022-01-21 NOTE — ASSESSMENT & PLAN NOTE
Rigid-predominate PDism. Appears under dosed on his current regimen, with FOG out of proportionate to rigidity however this appeared years after PDism.    Continues carbidopa/levodopa 25/100mg 2 tab PO QID     -Continue rasagiline 1 mg Qdaily  Suggested agressive PT - he continies rock steady boxing    Suggested re- aadd aricept 5mg but QHS for gait improvement  No hx arrythmias

## 2022-02-21 ENCOUNTER — PATIENT MESSAGE (OUTPATIENT)
Dept: NEUROLOGY | Facility: CLINIC | Age: 82
End: 2022-02-21
Payer: COMMERCIAL

## 2022-04-25 ENCOUNTER — OFFICE VISIT (OUTPATIENT)
Dept: NEUROLOGY | Facility: CLINIC | Age: 82
End: 2022-04-25
Payer: MEDICARE

## 2022-04-25 DIAGNOSIS — G20.C PARKINSONISM, UNSPECIFIED PARKINSONISM TYPE: ICD-10-CM

## 2022-04-25 PROCEDURE — 99214 PR OFFICE/OUTPT VISIT, EST, LEVL IV, 30-39 MIN: ICD-10-PCS | Mod: 95,,, | Performed by: PSYCHIATRY & NEUROLOGY

## 2022-04-25 PROCEDURE — 99214 OFFICE O/P EST MOD 30 MIN: CPT | Mod: 95,,, | Performed by: PSYCHIATRY & NEUROLOGY

## 2022-04-25 RX ORDER — CARBIDOPA AND LEVODOPA 25; 100 MG/1; MG/1
3 TABLET ORAL 4 TIMES DAILY
Qty: 360 TABLET | Refills: 11 | Status: SHIPPED | OUTPATIENT
Start: 2022-04-25 | End: 2023-04-25

## 2022-04-25 NOTE — PROGRESS NOTES
"The patient location is: HOME  The chief complaint leading to visit is: PD  1. Parkinsonism, unspecified Parkinsonism type       Visit type: Virtual visit with synchronous audio and video  Total time spent with patient: 20  Each patient to whom he or she provides medical services by telemedicine is:  (1) informed of the relationship between the physician and patient and the respective role of any other health care provider with respect to management of the patient; and (2) notified that he or she may decline to receive medical services by telemedicine and may withdraw from such care at any time.      MOVEMENT DISORDERS CLINIC Follow-up NOTE    PCP/Referring Provider: No referring provider defined for this encounter.  Date of Service: 4/25/2022    Chief Complaint: FOG    Interval Hx    FOG continues - tried carbidopa/levodopa 25/250mg 1 tab PO QID    No ON/OFF phenomenon  Uses laser cane and this helps  Uses a cane and walker  Freezing when going through doors    No side effects from carbidopa/levodopa such as nausea, halluciantions    -Continues rasagiline 1 mg Qdaily    Doing rock steady boxing    Speech is still soft - has speech devices but does not use it      MRI brain showed no hummingbird sign    Med Hx  Added donepezil  5mg but QAM rather than QHS and "felt funny" so stopped    Falls: once a month    Thinks he doesn't have OFFtime but his voice comes and goes  No dyskinesias, no orthostasis    PD Review of Symptoms:  Anosmia: "hes's never had one"  Dysarthria/Hypophonia: Severe  Dysphagia/Sialorrhea: Yes to both- see above  Hallucinations: None  Depression: None  Cognitive slowing: None  Impulsivity: None  Urinary changes: Frequency  Constipation: None  Orthostasis: None  Dyskinesia: None  Falls: As above  Freezing: Yes  Micrographia: Yes  Sleep issues:  -ADELINA: wears cpap  -RBD: None   -Sleep Quality: at times OK    "priorHPI:  2/17/20 Interval History    Currently started sinemet 1 tab TID and noticed " "improvement in his gait (no change in freezing) - wife describes decreased shuffling.  Ropinirole was decreased tablets from 1 mg  3, 3, 2 tabs -> 3, 2, 2 tabs and noticed that gait subsequently worsened again.     "Prior HPI  Braulio Monterroso is a R HANDED 81 y.o. male with a medical issues significant for Pes, HTN, ADELINA, Cspine dz s/p surgery, hip replacement, who presents with Parkinson's with new onset FOG [facility]. He notes 10 years ago progressive dysphagia and hypophonia, followed by a shuffling gait at age 76. This started with a L-sided foot drag. He currently walks with a cane and can go half a mile with stops. Without stops he can go 1 block. After Jan 2018 he started having FOG spells. He has near constant FOG art this point. He currently uses a walker at home and a cane. He falls once a month usually forwards.     Started carbidopa/levodopa 1 year ago but after he started getting migraines.  He's never had a clear ONtime  He currently takes ropinirole 3/3/2mg  Rasagiline 1mg QHS  He has ankle swelling  No memory loss  No orthostasis  No impulsiveness    Goes to speech therapy and gets injections in his vocal cords    Does rock steady boxing  He rides a stationary bike"      Review of Systems:   Review of Systems   Constitutional: Negative for fever.   HENT: Negative for congestion.    Eyes: Negative for double vision.   Respiratory: Negative for cough and shortness of breath.    Cardiovascular: Negative for chest pain and leg swelling.   Gastrointestinal: Negative for nausea.   Genitourinary: Negative for dysuria.   Musculoskeletal: Positive for falls.   Skin: Negative for rash.   Neurological: Positive for tremors and speech change. Negative for headaches.   Psychiatric/Behavioral: Negative for depression.       Neuroleptic exposure:  None    Current Medications:  Outpatient Encounter Medications as of 4/25/2022   Medication Sig Dispense Refill    carbidopa-levodopa  mg " (SINEMET)  mg per tablet Take 3 tablets by mouth 4 (four) times daily. 120 tablet 11    cholecalciferol, vitamin D3, (VITAMIN D3) 50 mcg (2,000 unit) Tab Take 2,000 Units by mouth 2 (two) times daily.      donepeziL (ARICEPT) 5 MG tablet Take 1 tablet (5 mg total) by mouth every evening. 30 tablet 11    hydroCHLOROthiazide (HYDRODIURIL) 25 MG tablet Take 25 mg by mouth.      losartan (COZAAR) 50 MG tablet Take 50 mg by mouth.      melatonin 3 mg Cap Take 3 mg by mouth as needed.      metoprolol tartrate (LOPRESSOR) 25 MG tablet Take 25 mg by mouth 2 (two) times daily.      omeprazole (PRILOSEC) 20 MG capsule Take 20 mg by mouth.      rasagiline (AZILECT) 1 mg Tab Take 1 mg by mouth once daily.      rosuvastatin (CRESTOR) 5 MG tablet Take 2.5 mg by mouth.      warfarin (COUMADIN) 5 MG tablet Take 5 mg by mouth.       No facility-administered encounter medications on file as of 4/25/2022.       Past Medical History:  Patient Active Problem List   Diagnosis    Parkinsonism    Ptosis    Primary freezing of gait    Orthostasis       Past Surgical History:  No past surgical history on file.    Current Living Situation: home    Social:  Social History     Socioeconomic History    Marital status:    Tobacco Use    Smoking status: Never Smoker    Smokeless tobacco: Never Used   Substance and Sexual Activity    Alcohol use: Never       Family History:  No family history on file.    PHYSICAL:  There were no vitals taken for this visit. Physical Exam  Constitutional: Well-developed, well-nourished, appears stated age  Eyes: No scleral icterus  ENT: Moist oral mucosa  Cardiovascular: No lower extremity edema   Respiratory: No labored breathing   Skin: No rash   Hematologic: No bruising  Psychiatric: No depression  Other: GI/ deferred   · Mental status: Alert and oriented to person, place, time, and situation;   · WORLD-DLROW; 5/5à 5/5 words, follows commands  · Speech: normal (not dysarthric), no  aphasia  · Cranial nerves:            · CN II: Pupils mid-position and equal, not tested light or accommodation- choppy saccades= POS SWJs - no gaze restrictions  · CN III, IV, VI: Extraocular movements full, no nystagmus visualized  · CN V: Not tested   · CN VII: Face strong  - L facial dystonia  · CN VIII: Hearing intact to voice and conversation   · CN IX, X: Palate raises midline and symmetric   · CN XI: Strong shoulder shrug B   · CN XII: Tongue appears midline   · Motor: Normal bulk by appearance, no drift   · Sensory: Not tested    · Gait: Prominent FOG otherwise good stride  · Deep tendon reflexes: Not tested  · Movement/Coordination                    Severe hypophonic speech.                     Moderate facial masking    Bradykinesia  ? Finger taps Finger flicks SCOTTIE Heel taps   Left 2+ 1+ - -   Right 2+ 1+ - -     No tremor    Laboratory Data:  NA    Imaging:  MRI Brain shows no hummingbird sign    DATscan R>L decreased uptake    Assessment//Plan:   Problem List Items Addressed This Visit        Neuro    Parkinsonism    Current Assessment & Plan     Rigid-predominate PDism. Appears under dosed on his current regimen, with FOG out of proportionate to rigidity however this appeared years after PDism.    Suggested carbidopa/levodopa 25/100mg 3 tab PO QID to help push through FOG    -Continue rasagiline 1 mg Qdaily  Suggested agressive PT - he continues rock steady boxing                   Amanda Delgado MD, MS  [facility]  Department of Neurology  Movement Disorders

## 2022-04-25 NOTE — ASSESSMENT & PLAN NOTE
Rigid-predominate PDism. Appears under dosed on his current regimen, with FOG out of proportionate to rigidity however this appeared years after PDism.    Suggested carbidopa/levodopa 25/100mg 3 tab PO QID to help push through FOG    -Continue rasagiline 1 mg Qdaily  Suggested agressive PT - he continues rock steady boxing

## 2022-05-05 ENCOUNTER — PATIENT MESSAGE (OUTPATIENT)
Dept: RESEARCH | Facility: HOSPITAL | Age: 82
End: 2022-05-05
Payer: COMMERCIAL

## 2022-07-12 ENCOUNTER — TELEPHONE (OUTPATIENT)
Dept: NEUROLOGY | Facility: CLINIC | Age: 82
End: 2022-07-12
Payer: COMMERCIAL

## 2022-07-12 NOTE — TELEPHONE ENCOUNTER
----- Message from Brianda Rodgers sent at 7/12/2022  8:54 AM CDT -----  Regarding: Virtual appointment  Contact: June/wife [phone]  Calling to reschedule appointment to virtual appointment due to weather. Please call to confirm or send message through portal.

## 2022-07-12 NOTE — TELEPHONE ENCOUNTER
Spoke with patient wife who stated she wanted to reschedule her husbands appointment or convert it to a virtual appointment due to the weather being bad on Friday. I informed her I would speak with Dr. Delgado and get back to her. Patient wife voiced understanding.

## 2022-07-15 ENCOUNTER — OFFICE VISIT (OUTPATIENT)
Dept: NEUROLOGY | Facility: CLINIC | Age: 82
End: 2022-07-15
Payer: MEDICARE

## 2022-07-15 DIAGNOSIS — R45.3 APATHY: ICD-10-CM

## 2022-07-15 DIAGNOSIS — G20.C PARKINSONISM, UNSPECIFIED PARKINSONISM TYPE: ICD-10-CM

## 2022-07-15 PROCEDURE — 99215 PR OFFICE/OUTPT VISIT, EST, LEVL V, 40-54 MIN: ICD-10-PCS | Mod: 95,,, | Performed by: PSYCHIATRY & NEUROLOGY

## 2022-07-15 PROCEDURE — 99215 OFFICE O/P EST HI 40 MIN: CPT | Mod: 95,,, | Performed by: PSYCHIATRY & NEUROLOGY

## 2022-07-15 RX ORDER — SERTRALINE HYDROCHLORIDE 25 MG/1
25 TABLET, FILM COATED ORAL DAILY
Qty: 30 TABLET | Refills: 11 | Status: SHIPPED | OUTPATIENT
Start: 2022-07-15 | End: 2023-07-15

## 2022-07-15 RX ORDER — CARBIDOPA AND LEVODOPA 25; 100 MG/1; MG/1
3 TABLET ORAL 4 TIMES DAILY
Qty: 120 TABLET | Refills: 11
Start: 2022-07-15 | End: 2023-07-15

## 2022-07-15 NOTE — ASSESSMENT & PLAN NOTE
Seems apathetic    Suggested zoloft 25mg QHS as he is flat and not participating in PT/ rock steady boxing which is likely driving gait decline  He understands that sertraline and rasagiline can produce serotonin syndrome in extremely rare cases and knows to stop these medications and come to seek urgent medical care in case of this. He agrees benefits outweigh risks.

## 2022-07-15 NOTE — PROGRESS NOTES
"The patient location is: HOME  The chief complaint leading to visit is: PD  1. Parkinsonism, unspecified Parkinsonism type  carbidopa-levodopa  mg (SINEMET)  mg per tablet   2. Apathy       Visit type: Virtual visit with synchronous audio and video  Total time spent with patient: 20  Each patient to whom he or she provides medical services by telemedicine is:  (1) informed of the relationship between the physician and patient and the respective role of any other health care provider with respect to management of the patient; and (2) notified that he or she may decline to receive medical services by telemedicine and may withdraw from such care at any time.      MOVEMENT DISORDERS CLINIC Follow-up NOTE    PCP/Referring Provider: No referring provider defined for this encounter.  Date of Service: 7/15/2022    Chief Complaint: FOG    Interval Hx    Had COVID and recovered  Feels depression post covid - seems apathetic    FOG continues - tried carbidopa/levodopa 25/100mg 3 tab PO QID  -Continues rasagiline 1 mg Qdaily    No ON/OFF phenomenon  Uses laser cane and this helps  Uses a cane and walker  Freezing when going through doors    No side effects from carbidopa/levodopa such as nausea, halluciantions    Was doing rock steady boxing  Speech is still soft - has speech devices but does not use it    MRI brain showed no hummingbird sign    No memory decline    Med Hx  Added donepezil  5mg but QAM rather than QHS and "felt funny" so stopped    Falls: once a month    Thinks he doesn't have OFFtime but his voice comes and goes  No dyskinesias, no orthostasis    PD Review of Symptoms:  Anosmia: "hes's never had one"  Dysarthria/Hypophonia: Severe  Dysphagia/Sialorrhea: Yes to both- see above  Hallucinations: None  Depression: None  Cognitive slowing: None  Impulsivity: None  Urinary changes: Frequency  Constipation: None  Orthostasis: None  Dyskinesia: None  Falls: As above  Freezing: Yes  Micrographia: Yes  Sleep " "issues:  -ADELINA: wears cpap  -RBD: None   -Sleep Quality: at times OK    "priorHPI:  2/17/20 Interval History    Currently started sinemet 1 tab TID and noticed improvement in his gait (no change in freezing) - wife describes decreased shuffling.  Ropinirole was decreased tablets from 1 mg  3, 3, 2 tabs -> 3, 2, 2 tabs and noticed that gait subsequently worsened again.     "Prior HPI  Braulio Monterroso is a R HANDED 82 y.o. male with a medical issues significant for Pes, HTN, ADELINA, Cspine dz s/p surgery, hip replacement, who presents with Parkinson's with new onset FOG [facility]. He notes 10 years ago progressive dysphagia and hypophonia, followed by a shuffling gait at age 76. This started with a L-sided foot drag. He currently walks with a cane and can go half a mile with stops. Without stops he can go 1 block. After Jan 2018 he started having FOG spells. He has near constant FOG art this point. He currently uses a walker at home and a cane. He falls once a month usually forwards.     Started carbidopa/levodopa 1 year ago but after he started getting migraines.  He's never had a clear ONtime  He currently takes ropinirole 3/3/2mg  Rasagiline 1mg QHS  He has ankle swelling  No memory loss  No orthostasis  No impulsiveness    Goes to speech therapy and gets injections in his vocal cords    Does rock steady boxing  He rides a stationary bike"      Review of Systems:   Review of Systems   Constitutional: Negative for fever.   HENT: Negative for congestion.    Eyes: Negative for double vision.   Respiratory: Negative for cough and shortness of breath.    Cardiovascular: Negative for chest pain and leg swelling.   Gastrointestinal: Negative for nausea.   Genitourinary: Negative for dysuria.   Musculoskeletal: Positive for falls.   Skin: Negative for rash.   Neurological: Positive for tremors and speech change. Negative for headaches.   Psychiatric/Behavioral: Negative for depression. "       Neuroleptic exposure:  None    Current Medications:  Outpatient Encounter Medications as of 7/15/2022   Medication Sig Dispense Refill    carbidopa-levodopa  mg (SINEMET)  mg per tablet Take 3 tablets by mouth 4 (four) times daily. 360 tablet 11    carbidopa-levodopa  mg (SINEMET)  mg per tablet Take 3 tablets by mouth 4 (four) times daily. 120 tablet 11    cholecalciferol, vitamin D3, (VITAMIN D3) 50 mcg (2,000 unit) Tab Take 2,000 Units by mouth 2 (two) times daily.      donepeziL (ARICEPT) 5 MG tablet Take 1 tablet (5 mg total) by mouth every evening. 30 tablet 11    hydroCHLOROthiazide (HYDRODIURIL) 25 MG tablet Take 25 mg by mouth.      losartan (COZAAR) 50 MG tablet Take 50 mg by mouth.      melatonin 3 mg Cap Take 3 mg by mouth as needed.      metoprolol tartrate (LOPRESSOR) 25 MG tablet Take 25 mg by mouth 2 (two) times daily.      omeprazole (PRILOSEC) 20 MG capsule Take 20 mg by mouth.      rasagiline (AZILECT) 1 mg Tab Take 1 mg by mouth once daily.      rosuvastatin (CRESTOR) 5 MG tablet Take 2.5 mg by mouth.      sertraline (ZOLOFT) 25 MG tablet Take 1 tablet (25 mg total) by mouth once daily. 30 tablet 11    warfarin (COUMADIN) 5 MG tablet Take 5 mg by mouth.      [DISCONTINUED] carbidopa-levodopa  mg (SINEMET)  mg per tablet Take 3 tablets by mouth 4 (four) times daily. 120 tablet 11     No facility-administered encounter medications on file as of 7/15/2022.       Past Medical History:  Patient Active Problem List   Diagnosis    Parkinsonism    Ptosis    Primary freezing of gait    Orthostasis    Apathy       Past Surgical History:  No past surgical history on file.    Current Living Situation: home    Social:  Social History     Socioeconomic History    Marital status:    Tobacco Use    Smoking status: Never Smoker    Smokeless tobacco: Never Used   Substance and Sexual Activity    Alcohol use: Never       Family History:  No  family history on file.    PHYSICAL:  There were no vitals taken for this visit. Physical Exam  Constitutional: Well-developed, well-nourished, appears stated age  Eyes: No scleral icterus  ENT: Moist oral mucosa  Cardiovascular: No lower extremity edema   Respiratory: No labored breathing   Skin: No rash   Hematologic: No bruising  Psychiatric: No depression  Other: GI/ deferred   · Mental status: Alert and oriented to person, place, time, and situation;   · WORLD-DLROW; 5/5à 5/5 words, follows commands  · Speech: normal (not dysarthric), no aphasia  · Cranial nerves:            · CN II: Pupils mid-position and equal, not tested light or accommodation- choppy saccades= POS SWJs - no gaze restrictions  · CN III, IV, VI: Extraocular movements full, no nystagmus visualized  · CN V: Not tested   · CN VII: Face strong  - L facial dystonia  · CN VIII: Hearing intact to voice and conversation   · CN IX, X: Palate raises midline and symmetric   · CN XI: Strong shoulder shrug B   · CN XII: Tongue appears midline   · Motor: Normal bulk by appearance, no drift   · Sensory: Not tested    · Gait: Prominent FOG otherwise good stride  · Deep tendon reflexes: Not tested  · Movement/Coordination                    Severe hypophonic speech.                     Severe facial masking    Bradykinesia  ? Finger taps Finger flicks SCOTTIE Heel taps   Left 2+ 1+ - -   Right 3+ 1+ - -     No tremor    Laboratory Data:  NA    Imaging:  MRI Brain shows no hummingbird sign    DATscan R>L decreased uptake    Assessment//Plan:   Problem List Items Addressed This Visit        Neuro    Parkinsonism    Current Assessment & Plan     Rigid-predominate PDism. Appears under dosed on his current regimen, with FOG out of proportionate to rigidity however this appeared years after PDism.    Suggested carbidopa/levodopa 25/100mg 3 tab PO QID to help push through FOG    -Continue rasagiline 1 mg Qdaily  Suggested agressive PT - he continues rock steady  boxing               Relevant Medications    carbidopa-levodopa  mg (SINEMET)  mg per tablet       Psychiatric    Apathy    Current Assessment & Plan     Seems apathetic    Suggested zoloft 25mg QHS as he is flat and not participating in PT/ rock steady boxing which is likely driving gait decline  He understands that sertraline and rasagiline can produce serotonin syndrome in extremely rare cases and knows to stop these medications and come to seek urgent medical care in case of this. He agrees benefits outweigh risks.                 Amanda Delgado MD, MS  [facility]  Department of Neurology  Movement Disorders

## 2023-01-09 ENCOUNTER — OFFICE VISIT (OUTPATIENT)
Dept: NEUROLOGY | Facility: CLINIC | Age: 83
End: 2023-01-09
Payer: MEDICARE

## 2023-01-09 DIAGNOSIS — G20.C PARKINSONISM, UNSPECIFIED PARKINSONISM TYPE: ICD-10-CM

## 2023-01-09 PROCEDURE — 99214 OFFICE O/P EST MOD 30 MIN: CPT | Mod: 95,,, | Performed by: PSYCHIATRY & NEUROLOGY

## 2023-01-09 PROCEDURE — 99214 PR OFFICE/OUTPT VISIT, EST, LEVL IV, 30-39 MIN: ICD-10-PCS | Mod: 95,,, | Performed by: PSYCHIATRY & NEUROLOGY

## 2023-01-09 NOTE — ASSESSMENT & PLAN NOTE
Rigid-predominate PDism. Appears under dosed on his current regimen, with FOG out of proportionate to rigidity however this appeared years after PDism.  Recovering from head and neck surgery    Suggested carbidopa/levodopa 25/100mg 3 tab PO QID to help push through FOG - start EARLY to prevent offtime  Avoid mealtimes  -Continue rasagiline 1 mg Qdaily  Suggested agressive PT

## 2023-01-09 NOTE — PROGRESS NOTES
"The patient location is: HOME  The chief complaint leading to visit is: PD  1. Parkinsonism, unspecified Parkinsonism type          Visit type: Virtual visit with synchronous audio and video  Total time spent with patient: 20  Each patient to whom he or she provides medical services by telemedicine is:  (1) informed of the relationship between the physician and patient and the respective role of any other health care provider with respect to management of the patient; and (2) notified that he or she may decline to receive medical services by telemedicine and may withdraw from such care at any time.      [facility] DISORD[facility] Follow-up NOTE    PCP/Referring Provider: No referring provider defined for this encounter.  Date of Service: 1/9/2023    Chief Complaint: FOG    Interval Hx  Had major neck surgery- squamous cell  2 weeks admission  Getting  physical therapy  Walks from chair to wheelchair  FOG continues - tried carbidopa/levodopa 25/100mg 3 tab PO TID  Has poor days and bad days  -Continues rasagiline 1 mg Qdaily    No side effects from carbidopa/levodopa such as nausea, hallucinations    MRI brain showed no hummingbird sign  No memory decline    Med Hx  Added donepezil  5mg but QAM rather than QHS and "felt funny" so stopped    Falls: once a month    Thinks he doesn't have OFFtime but his voice comes and goes  No dyskinesias, no orthostasis    PD Review of Symptoms:  Anosmia: "hes's never had one"  Dysarthria/Hypophonia: Severe  Dysphagia/Sialorrhea: Yes to both- see above  Hallucinations: None  Depression: None  Cognitive slowing: None  Impulsivity: None  Urinary changes: Frequency  Constipation: None  Orthostasis: None  Dyskinesia: None  Falls: As above  Freezing: Yes  Micrographia: Yes  Sleep issues:  -ADELINA: wears cpap  -RBD: None   -Sleep Quality: at times OK    "priorHPI:  2/17/20 Interval History    Currently started sinemet 1 tab TID and noticed improvement in his gait (no change in freezing) - " "wife describes decreased shuffling.  Ropinirole was decreased tablets from 1 mg  3, 3, 2 tabs -> 3, 2, 2 tabs and noticed that gait subsequently worsened again.     "Prior HPI  Braulio Monterroso is a R HANDED 82 y.o. male with a medical issues significant for Pes, HTN, ADELINA, Cspine dz s/p surgery, hip replacement, who presents with Parkinson's with new onset FOG [facility]. He notes 10 years ago progressive dysphagia and hypophonia, followed by a shuffling gait at age 76. This started with a L-sided foot drag. He currently walks with a cane and can go half a mile with stops. Without stops he can go 1 block. After Jan 2018 he started having FOG spells. He has near constant FOG art this point. He currently uses a walker at home and a cane. He falls once a month usually forwards.     Started carbidopa/levodopa 1 year ago but after he started getting migraines.  He's never had a clear ONtime  He currently takes ropinirole 3/3/2mg  Rasagiline 1mg QHS  He has ankle swelling  No memory loss  No orthostasis  No impulsiveness    Goes to speech therapy and gets injections in his vocal cords    Does rock steady boxing  He rides a stationary bike"      Review of Systems:   Review of Systems   Constitutional:  Negative for fever.   HENT:  Negative for congestion.    Eyes:  Negative for double vision.   Respiratory:  Negative for cough and shortness of breath.    Cardiovascular:  Negative for chest pain and leg swelling.   Gastrointestinal:  Negative for nausea.   Genitourinary:  Negative for dysuria.   Musculoskeletal:  Positive for falls.   Skin:  Negative for rash.   Neurological:  Positive for tremors and speech change. Negative for headaches.   Psychiatric/Behavioral:  Negative for depression.      Neuroleptic exposure:  None    Current Medications:  Outpatient Encounter Medications as of 1/9/2023   Medication Sig Dispense Refill    carbidopa-levodopa  mg (SINEMET)  mg per tablet Take 3 " tablets by mouth 4 (four) times daily. 360 tablet 11    carbidopa-levodopa  mg (SINEMET)  mg per tablet Take 3 tablets by mouth 4 (four) times daily. 120 tablet 11    cholecalciferol, vitamin D3, (VITAMIN D3) 50 mcg (2,000 unit) Tab Take 2,000 Units by mouth 2 (two) times daily.      hydroCHLOROthiazide (HYDRODIURIL) 25 MG tablet Take 25 mg by mouth.      losartan (COZAAR) 50 MG tablet Take 50 mg by mouth.      melatonin 3 mg Cap Take 3 mg by mouth as needed.      metoprolol tartrate (LOPRESSOR) 25 MG tablet Take 25 mg by mouth 2 (two) times daily.      omeprazole (PRILOSEC) 20 MG capsule Take 20 mg by mouth.      rasagiline (AZILECT) 1 mg Tab Take 1 mg by mouth once daily.      rosuvastatin (CRESTOR) 5 MG tablet Take 2.5 mg by mouth.      sertraline (ZOLOFT) 25 MG tablet Take 1 tablet (25 mg total) by mouth once daily. 30 tablet 11    warfarin (COUMADIN) 5 MG tablet Take 5 mg by mouth.       No facility-administered encounter medications on file as of 1/9/2023.       Past Medical History:  Patient Active Problem List   Diagnosis    Parkinsonism    Ptosis    Primary freezing of gait    Orthostasis    Apathy       Past Surgical History:  No past surgical history on file.    Current Living Situation: home    Social:  Social History     Socioeconomic History    Marital status:    Tobacco Use    Smoking status: Never    Smokeless tobacco: Never   Substance and Sexual Activity    Alcohol use: Never       Family History:  No family history on file.    PHYSICAL:  There were no vitals taken for this visit. Physical Exam  Constitutional: Well-developed, well-nourished, appears stated age  Eyes: No scleral icterus  ENT: Moist oral mucosa  Cardiovascular: No lower extremity edema   Respiratory: No labored breathing   Skin: No rash   Hematologic: No bruising  Psychiatric: No depression  Other: GI/ deferred   Mental status: Alert and oriented to person, place, time, and situation;   WORLD-DLROW; 5/5à 5/5  words, follows commands  Speech: normal (not dysarthric), no aphasia  Cranial nerves:            CN II: Pupils mid-position and equal, not tested light or accommodation- choppy saccades= POS SWJs - no gaze restrictions  CN III, IV, VI: Extraocular movements full, no nystagmus visualized  CN V: Not tested   CN VII: Face strong  - L facial dystonia  CN VIII: Hearing intact to voice and conversation   CN IX, X: Palate raises midline and symmetric   CN XI: Strong shoulder shrug B   CN XII: Tongue appears midline   Motor: Normal bulk by appearance, no drift   Sensory: Not tested    Gait: Prominent FOG otherwise good stride  Deep tendon reflexes: Not tested  Movement/Coordination                    Severe hypophonic speech.                     Severe facial masking    Bradykinesia  ? Finger taps Finger flicks SCOTTIE Heel taps   Left 2+ 1+ - -   Right 3+ 1+ - -     No tremor    Laboratory Data:  NA    Imaging:  MRI Brain shows no hummingbird sign    DATscan R>L decreased uptake    Assessment//Plan:   Problem List Items Addressed This Visit          Neuro    Parkinsonism    Current Assessment & Plan     Rigid-predominate PDism. Appears under dosed on his current regimen, with FOG out of proportionate to rigidity however this appeared years after PDism.  Recovering from head and neck surgery    Suggested carbidopa/levodopa 25/100mg 3 tab PO QID to help push through FOG - start EARLY to prevent offtime  Avoid mealtimes  -Continue rasagiline 1 mg Qdaily  Suggested agressive PT               Amanda Delgado MD, MS [facility]  Department of Neurology  Movement Disorders

## 2023-03-15 ENCOUNTER — PATIENT MESSAGE (OUTPATIENT)
Dept: NEUROLOGY | Facility: CLINIC | Age: 83
End: 2023-03-15
Payer: COMMERCIAL

## 2024-05-22 ENCOUNTER — PATIENT MESSAGE (OUTPATIENT)
Dept: NEUROLOGY | Facility: CLINIC | Age: 84
End: 2024-05-22
Payer: COMMERCIAL